# Patient Record
Sex: FEMALE | NOT HISPANIC OR LATINO | URBAN - METROPOLITAN AREA
[De-identification: names, ages, dates, MRNs, and addresses within clinical notes are randomized per-mention and may not be internally consistent; named-entity substitution may affect disease eponyms.]

---

## 2019-02-16 ENCOUNTER — APPOINTMENT (EMERGENCY)
Dept: CT IMAGING | Facility: HOSPITAL | Age: 79
DRG: 871 | End: 2019-02-16
Payer: COMMERCIAL

## 2019-02-16 ENCOUNTER — APPOINTMENT (EMERGENCY)
Dept: RADIOLOGY | Facility: HOSPITAL | Age: 79
DRG: 871 | End: 2019-02-16
Payer: COMMERCIAL

## 2019-02-16 ENCOUNTER — HOSPITAL ENCOUNTER (INPATIENT)
Facility: HOSPITAL | Age: 79
LOS: 5 days | Discharge: HOME HEALTH | DRG: 871 | End: 2019-02-21
Attending: EMERGENCY MEDICINE | Admitting: HOSPITALIST
Payer: COMMERCIAL

## 2019-02-16 DIAGNOSIS — J18.9 PNEUMONIA DUE TO INFECTIOUS ORGANISM, UNSPECIFIED LATERALITY, UNSPECIFIED PART OF LUNG: ICD-10-CM

## 2019-02-16 DIAGNOSIS — J18.9 PNEUMONIA OF BOTH LOWER LOBES DUE TO INFECTIOUS ORGANISM: ICD-10-CM

## 2019-02-16 DIAGNOSIS — A41.9 SEPSIS (HCC): ICD-10-CM

## 2019-02-16 DIAGNOSIS — R11.2 NAUSEA VOMITING AND DIARRHEA: ICD-10-CM

## 2019-02-16 DIAGNOSIS — N17.9 AKI (ACUTE KIDNEY INJURY) (HCC): ICD-10-CM

## 2019-02-16 DIAGNOSIS — J18.9 PNEUMONIA: Primary | ICD-10-CM

## 2019-02-16 DIAGNOSIS — R19.7 NAUSEA VOMITING AND DIARRHEA: ICD-10-CM

## 2019-02-16 LAB
ALBUMIN SERPL BCP-MCNC: 3.9 G/DL (ref 3.5–5.7)
ALP SERPL-CCNC: 38 U/L (ref 55–165)
ALT SERPL W P-5'-P-CCNC: 4 U/L (ref 7–52)
ANION GAP SERPL CALCULATED.3IONS-SCNC: 14 MMOL/L (ref 4–13)
ANISOCYTOSIS BLD QL SMEAR: PRESENT
ARTERIAL PATENCY WRIST A: YES
AST SERPL W P-5'-P-CCNC: 12 U/L (ref 13–39)
BASE EXCESS BLDA CALC-SCNC: -12.4 MMOL/L (ref -2–3)
BILIRUB SERPL-MCNC: 0.5 MG/DL (ref 0.2–1)
BUN SERPL-MCNC: 39 MG/DL (ref 7–25)
CALCIUM SERPL-MCNC: 9.6 MG/DL (ref 8.6–10.5)
CHLORIDE SERPL-SCNC: 109 MMOL/L (ref 98–107)
CO2 SERPL-SCNC: 18 MMOL/L (ref 21–31)
CREAT SERPL-MCNC: 2.77 MG/DL (ref 0.6–1.2)
ERYTHROCYTE [DISTWIDTH] IN BLOOD BY AUTOMATED COUNT: 15.6 % (ref 11.5–14.5)
FLUAV AG SPEC QL IA: NEGATIVE
FLUBV AG SPEC QL IA: NEGATIVE
GFR SERPL CREATININE-BSD FRML MDRD: 15 ML/MIN/1.73SQ M
GLUCOSE SERPL-MCNC: 103 MG/DL (ref 65–99)
HCO3 BLDA-SCNC: 11.1 MMOL/L (ref 22–28)
HCT VFR BLD AUTO: 39.5 % (ref 42–47)
HGB BLD-MCNC: 12.5 G/DL (ref 12–16)
INR PPP: 2.58 (ref 0.9–1.5)
LACTATE SERPL-SCNC: 2.7 MMOL/L (ref 0.5–2)
LACTATE SERPL-SCNC: 4.2 MMOL/L (ref 0.5–2)
LACTATE SERPL-SCNC: 4.3 MMOL/L (ref 0.5–2)
LIPASE SERPL-CCNC: 31 U/L (ref 11–82)
LYMPHOCYTES # BLD AUTO: 0.29 THOUSAND/UL (ref 0.6–4.47)
LYMPHOCYTES # BLD AUTO: 2 % (ref 20–51)
MAGNESIUM SERPL-MCNC: 1.6 MG/DL (ref 1.9–2.7)
MCH RBC QN AUTO: 29 PG (ref 26–34)
MCHC RBC AUTO-ENTMCNC: 31.7 G/DL (ref 31–37)
MCV RBC AUTO: 92 FL (ref 81–99)
MONOCYTES # BLD AUTO: 0.43 THOUSAND/UL (ref 0–1.22)
MONOCYTES NFR BLD AUTO: 3 % (ref 4–12)
NEUTS BAND NFR BLD MANUAL: 9 % (ref 0–8)
NEUTS SEG # BLD: 13.59 THOUSAND/UL (ref 1.81–6.82)
NEUTS SEG NFR BLD AUTO: 86 % (ref 42–75)
NRBC BLD AUTO-RTO: 0 /100 WBCS
O2 CT BLDA-SCNC: 15 ML/DL
OXYHGB MFR BLDA: 96.9 % (ref 94–100)
PCO2 BLDA: 19.5 MM HG (ref 35–45)
PH BLDA: 7.37 [PH] (ref 7.35–7.45)
PLATELET # BLD AUTO: 374 THOUSANDS/UL (ref 149–390)
PLATELET BLD QL SMEAR: ADEQUATE
PMV BLD AUTO: 8.2 FL (ref 8.6–11.7)
PO2 BLDA: 93 MM HG (ref 80–100)
POTASSIUM SERPL-SCNC: 4.2 MMOL/L (ref 3.5–5.5)
PROT SERPL-MCNC: 7.1 G/DL (ref 6.4–8.9)
PROTHROMBIN TIME: 30.2 SECONDS (ref 10.2–13)
RBC # BLD AUTO: 4.31 MILLION/UL (ref 3.9–5.2)
RBC MORPH BLD: ABNORMAL
SODIUM SERPL-SCNC: 141 MMOL/L (ref 134–143)
SPECIMEN SOURCE: ABNORMAL
TOTAL CELLS COUNTED SPEC: 100
TROPONIN I SERPL-MCNC: 0.03 NG/ML
TROPONIN I SERPL-MCNC: <0.03 NG/ML
WBC # BLD AUTO: 14.3 THOUSAND/UL (ref 4.8–10.8)

## 2019-02-16 PROCEDURE — 82805 BLOOD GASES W/O2 SATURATION: CPT | Performed by: NURSE PRACTITIONER

## 2019-02-16 PROCEDURE — 94664 DEMO&/EVAL PT USE INHALER: CPT

## 2019-02-16 PROCEDURE — 87081 CULTURE SCREEN ONLY: CPT | Performed by: NURSE PRACTITIONER

## 2019-02-16 PROCEDURE — 94760 N-INVAS EAR/PLS OXIMETRY 1: CPT

## 2019-02-16 PROCEDURE — 85027 COMPLETE CBC AUTOMATED: CPT | Performed by: PHYSICIAN ASSISTANT

## 2019-02-16 PROCEDURE — 84484 ASSAY OF TROPONIN QUANT: CPT | Performed by: NURSE PRACTITIONER

## 2019-02-16 PROCEDURE — 36415 COLL VENOUS BLD VENIPUNCTURE: CPT | Performed by: PHYSICIAN ASSISTANT

## 2019-02-16 PROCEDURE — 83735 ASSAY OF MAGNESIUM: CPT | Performed by: PHYSICIAN ASSISTANT

## 2019-02-16 PROCEDURE — 96374 THER/PROPH/DIAG INJ IV PUSH: CPT

## 2019-02-16 PROCEDURE — 83605 ASSAY OF LACTIC ACID: CPT | Performed by: PHYSICIAN ASSISTANT

## 2019-02-16 PROCEDURE — 71046 X-RAY EXAM CHEST 2 VIEWS: CPT

## 2019-02-16 PROCEDURE — 84484 ASSAY OF TROPONIN QUANT: CPT | Performed by: PHYSICIAN ASSISTANT

## 2019-02-16 PROCEDURE — 74176 CT ABD & PELVIS W/O CONTRAST: CPT

## 2019-02-16 PROCEDURE — 85610 PROTHROMBIN TIME: CPT | Performed by: NURSE PRACTITIONER

## 2019-02-16 PROCEDURE — 93005 ELECTROCARDIOGRAM TRACING: CPT

## 2019-02-16 PROCEDURE — 99223 1ST HOSP IP/OBS HIGH 75: CPT | Performed by: NURSE PRACTITIONER

## 2019-02-16 PROCEDURE — 85007 BL SMEAR W/DIFF WBC COUNT: CPT | Performed by: PHYSICIAN ASSISTANT

## 2019-02-16 PROCEDURE — 87040 BLOOD CULTURE FOR BACTERIA: CPT | Performed by: PHYSICIAN ASSISTANT

## 2019-02-16 PROCEDURE — 83605 ASSAY OF LACTIC ACID: CPT | Performed by: NURSE PRACTITIONER

## 2019-02-16 PROCEDURE — 99285 EMERGENCY DEPT VISIT HI MDM: CPT

## 2019-02-16 PROCEDURE — 80053 COMPREHEN METABOLIC PANEL: CPT | Performed by: PHYSICIAN ASSISTANT

## 2019-02-16 PROCEDURE — 36600 WITHDRAWAL OF ARTERIAL BLOOD: CPT

## 2019-02-16 PROCEDURE — 83690 ASSAY OF LIPASE: CPT | Performed by: PHYSICIAN ASSISTANT

## 2019-02-16 PROCEDURE — 87631 RESP VIRUS 3-5 TARGETS: CPT | Performed by: NURSE PRACTITIONER

## 2019-02-16 RX ORDER — ASPIRIN 81 MG/1
81 TABLET ORAL DAILY
Status: DISCONTINUED | OUTPATIENT
Start: 2019-02-17 | End: 2019-02-21 | Stop reason: HOSPADM

## 2019-02-16 RX ORDER — ASPIRIN 81 MG/1
81 TABLET ORAL DAILY
COMMUNITY
End: 2019-02-22

## 2019-02-16 RX ORDER — ATORVASTATIN CALCIUM 10 MG/1
20 TABLET, FILM COATED ORAL
Status: DISCONTINUED | OUTPATIENT
Start: 2019-02-16 | End: 2019-02-16

## 2019-02-16 RX ORDER — METOCLOPRAMIDE HYDROCHLORIDE 5 MG/ML
10 INJECTION INTRAMUSCULAR; INTRAVENOUS ONCE
Status: COMPLETED | OUTPATIENT
Start: 2019-02-16 | End: 2019-02-16

## 2019-02-16 RX ORDER — NIFEDIPINE 20 MG/1
20 CAPSULE ORAL DAILY
COMMUNITY
End: 2019-02-21 | Stop reason: HOSPADM

## 2019-02-16 RX ORDER — ONDANSETRON 2 MG/ML
4 INJECTION INTRAMUSCULAR; INTRAVENOUS EVERY 6 HOURS PRN
Status: DISCONTINUED | OUTPATIENT
Start: 2019-02-16 | End: 2019-02-21 | Stop reason: HOSPADM

## 2019-02-16 RX ORDER — NIFEDIPINE 10 MG/1
20 CAPSULE ORAL DAILY
Status: DISCONTINUED | OUTPATIENT
Start: 2019-02-16 | End: 2019-02-21 | Stop reason: HOSPADM

## 2019-02-16 RX ORDER — WARFARIN SODIUM 3 MG/1
TABLET ORAL
COMMUNITY
End: 2019-02-22

## 2019-02-16 RX ORDER — CEFEPIME HYDROCHLORIDE 2 G/50ML
2000 INJECTION, SOLUTION INTRAVENOUS EVERY 24 HOURS
Status: DISCONTINUED | OUTPATIENT
Start: 2019-02-17 | End: 2019-02-16

## 2019-02-16 RX ORDER — PANTOPRAZOLE SODIUM 40 MG/1
40 TABLET, DELAYED RELEASE ORAL DAILY
COMMUNITY
End: 2019-02-22

## 2019-02-16 RX ORDER — VANCOMYCIN HYDROCHLORIDE 1 G/200ML
15 INJECTION, SOLUTION INTRAVENOUS ONCE
Status: COMPLETED | OUTPATIENT
Start: 2019-02-16 | End: 2019-02-16

## 2019-02-16 RX ORDER — MAGNESIUM SULFATE HEPTAHYDRATE 40 MG/ML
2 INJECTION, SOLUTION INTRAVENOUS ONCE
Status: COMPLETED | OUTPATIENT
Start: 2019-02-16 | End: 2019-02-16

## 2019-02-16 RX ORDER — FEBUXOSTAT 40 MG/1
40 TABLET, FILM COATED ORAL DAILY
COMMUNITY
End: 2019-02-22

## 2019-02-16 RX ORDER — LORAZEPAM 2 MG/ML
0.5 INJECTION INTRAMUSCULAR ONCE
Status: DISCONTINUED | OUTPATIENT
Start: 2019-02-16 | End: 2019-02-21 | Stop reason: HOSPADM

## 2019-02-16 RX ORDER — LEVALBUTEROL 1.25 MG/.5ML
1.25 SOLUTION, CONCENTRATE RESPIRATORY (INHALATION)
Status: DISCONTINUED | OUTPATIENT
Start: 2019-02-16 | End: 2019-02-17

## 2019-02-16 RX ORDER — CEFEPIME HYDROCHLORIDE 1 G/50ML
1000 INJECTION, SOLUTION INTRAVENOUS EVERY 24 HOURS
Status: DISCONTINUED | OUTPATIENT
Start: 2019-02-17 | End: 2019-02-16

## 2019-02-16 RX ORDER — CEFEPIME HYDROCHLORIDE 1 G/50ML
1000 INJECTION, SOLUTION INTRAVENOUS EVERY 24 HOURS
Status: DISCONTINUED | OUTPATIENT
Start: 2019-02-17 | End: 2019-02-21 | Stop reason: HOSPADM

## 2019-02-16 RX ORDER — ACETAMINOPHEN 325 MG/1
650 TABLET ORAL EVERY 6 HOURS PRN
Status: DISCONTINUED | OUTPATIENT
Start: 2019-02-16 | End: 2019-02-21 | Stop reason: HOSPADM

## 2019-02-16 RX ORDER — ALBUTEROL SULFATE 2.5 MG/3ML
2.5 SOLUTION RESPIRATORY (INHALATION) EVERY 4 HOURS PRN
Status: DISCONTINUED | OUTPATIENT
Start: 2019-02-16 | End: 2019-02-16

## 2019-02-16 RX ORDER — PANTOPRAZOLE SODIUM 40 MG/1
40 TABLET, DELAYED RELEASE ORAL DAILY
Status: DISCONTINUED | OUTPATIENT
Start: 2019-02-17 | End: 2019-02-16

## 2019-02-16 RX ORDER — NIFEDIPINE 10 MG/1
20 CAPSULE ORAL DAILY
Status: DISCONTINUED | OUTPATIENT
Start: 2019-02-17 | End: 2019-02-16

## 2019-02-16 RX ORDER — CEFEPIME HYDROCHLORIDE 2 G/50ML
2000 INJECTION, SOLUTION INTRAVENOUS EVERY 12 HOURS
Status: DISCONTINUED | OUTPATIENT
Start: 2019-02-16 | End: 2019-02-16

## 2019-02-16 RX ORDER — SODIUM CHLORIDE FOR INHALATION 0.9 %
3 VIAL, NEBULIZER (ML) INHALATION
Status: DISCONTINUED | OUTPATIENT
Start: 2019-02-16 | End: 2019-02-17

## 2019-02-16 RX ORDER — SODIUM CHLORIDE 9 MG/ML
50 INJECTION, SOLUTION INTRAVENOUS CONTINUOUS
Status: DISCONTINUED | OUTPATIENT
Start: 2019-02-16 | End: 2019-02-19

## 2019-02-16 RX ADMIN — PIPERACILLIN SODIUM AND TAZOBACTAM SODIUM 3.38 G: 3; .375 INJECTION, POWDER, LYOPHILIZED, FOR SOLUTION INTRAVENOUS at 16:32

## 2019-02-16 RX ADMIN — SODIUM CHLORIDE, POTASSIUM CHLORIDE, SODIUM LACTATE AND CALCIUM CHLORIDE 1000 ML: 600; 310; 30; 20 INJECTION, SOLUTION INTRAVENOUS at 20:56

## 2019-02-16 RX ADMIN — MAGNESIUM SULFATE IN WATER 2 G: 40 INJECTION, SOLUTION INTRAVENOUS at 20:36

## 2019-02-16 RX ADMIN — METOCLOPRAMIDE 10 MG: 5 INJECTION, SOLUTION INTRAMUSCULAR; INTRAVENOUS at 15:56

## 2019-02-16 RX ADMIN — SODIUM CHLORIDE, POTASSIUM CHLORIDE, SODIUM LACTATE AND CALCIUM CHLORIDE 1000 ML: 600; 310; 30; 20 INJECTION, SOLUTION INTRAVENOUS at 20:42

## 2019-02-16 RX ADMIN — VANCOMYCIN HYDROCHLORIDE 1000 MG: 1 INJECTION, SOLUTION INTRAVENOUS at 17:57

## 2019-02-16 RX ADMIN — SODIUM CHLORIDE 125 ML/HR: 9 INJECTION, SOLUTION INTRAVENOUS at 23:42

## 2019-02-16 RX ADMIN — VANCOMYCIN HYDROCHLORIDE 125 MG: 500 INJECTION, POWDER, LYOPHILIZED, FOR SOLUTION INTRAVENOUS at 23:53

## 2019-02-16 RX ADMIN — SODIUM CHLORIDE 1000 ML: 0.9 INJECTION, SOLUTION INTRAVENOUS at 15:56

## 2019-02-16 RX ADMIN — CEFEPIME HYDROCHLORIDE 1000 MG: 1 INJECTION, SOLUTION INTRAVENOUS at 23:59

## 2019-02-16 RX ADMIN — METRONIDAZOLE 500 MG: 500 INJECTION, SOLUTION INTRAVENOUS at 19:40

## 2019-02-16 RX ADMIN — SODIUM CHLORIDE 125 ML/HR: 9 INJECTION, SOLUTION INTRAVENOUS at 17:33

## 2019-02-16 RX ADMIN — ALBUTEROL SULFATE 2.5 MG: 2.5 SOLUTION RESPIRATORY (INHALATION) at 18:38

## 2019-02-16 RX ADMIN — METOPROLOL TARTRATE 25 MG: 25 TABLET, FILM COATED ORAL at 20:53

## 2019-02-16 RX ADMIN — NIFEDIPINE 20 MG: 10 CAPSULE ORAL at 20:54

## 2019-02-16 NOTE — ED PROVIDER NOTES
History  No chief complaint on file  Patient here with family members with complaint of nausea vomiting and diarrhea since yesterday  Patient was in [facility] recently where she received antibiotics  She has not had antibiotics for approximately 3 weeks  Patient has a history of chronic AFib  Patient is alert oriented as can answer questions appropriately at time exam           Cannot display prior to admission medications because the patient has not been admitted in this contact  No past medical history on file  No past surgical history on file  No family history on file  I have reviewed and agree with the history as documented  Social History     Tobacco Use    Smoking status: Not on file   Substance Use Topics    Alcohol use: Not on file    Drug use: Not on file        Review of Systems   Constitutional: Negative for activity change, fatigue and fever  HENT: Negative for ear pain, sinus pain and sneezing  Eyes: Negative for pain, discharge and redness  Respiratory: Negative for choking, shortness of breath, wheezing and stridor  Cardiovascular: Negative for chest pain, palpitations and leg swelling  Gastrointestinal: Positive for diarrhea, nausea and vomiting  Negative for abdominal distention and constipation  Endocrine: Negative for cold intolerance and heat intolerance  Genitourinary: Negative for difficulty urinating, dysuria and frequency  Musculoskeletal: Negative for back pain, gait problem and joint swelling  Skin: Negative for color change, pallor, rash and wound  Neurological: Negative for dizziness, facial asymmetry and numbness  Hematological: Negative for adenopathy  Does not bruise/bleed easily  Psychiatric/Behavioral: Negative for agitation, behavioral problems and confusion  Physical Exam  Physical Exam   Constitutional: She appears well-developed and well-nourished  HENT:   Head: Normocephalic and atraumatic     Right Ear: External ear normal    Left Ear: External ear normal    Nose: Nose normal    Mouth/Throat: Oropharynx is clear and moist    Eyes: Pupils are equal, round, and reactive to light  Conjunctivae and EOM are normal    Neck: Normal range of motion  Neck supple  Cardiovascular: Normal rate, regular rhythm, normal heart sounds and intact distal pulses  Pulmonary/Chest: Effort normal and breath sounds normal    Abdominal: Bowel sounds are normal    Musculoskeletal: Normal range of motion  Neurological: She is alert  Skin: Skin is warm and dry  Capillary refill takes less than 2 seconds  Psychiatric: She has a normal mood and affect  Her behavior is normal  Judgment and thought content normal        Vital Signs  ED Triage Vitals   Temp Pulse Resp BP SpO2   -- -- -- -- --      Temp src Heart Rate Source Patient Position - Orthostatic VS BP Location FiO2 (%)   -- -- -- -- --      Pain Score       --           There were no vitals filed for this visit  Visual Acuity      ED Medications  Medications - No data to display    Diagnostic Studies  Results Reviewed     None                 No orders to display              Procedures  Procedures       Phone Contacts  ED Phone Contact    ED Course                               MDM    Disposition  Final diagnoses:   None     ED Disposition     None      Follow-up Information    None         Patient's Medications    No medications on file     No discharge procedures on file      ED Provider  Electronically Signed by           Tita Snellen, PA-C  02/16/19 3813

## 2019-02-16 NOTE — H&P
H&P- [facility] 1940, 78 y o  female MRN: [id number]    Unit/Bed#: ICU 01 Encounter: [id number]    Primary Care Provider: Devonte Rutledge MD   Date and time admitted to hospital: 2/16/2019  1:51 PM        * Severe sepsis [facility])  Assessment & Plan  · The patient meets criteria for severe sepsis with tachycardia, leukocytosis, lactic acidosis and suspecting acute kidney injury (no baseline creatinine to compare)  · Suspected the source to be secondary to healthcare associated pneumonia due to recent hospitalization 1 month ago  · Will admit the patient to ICU  · Will continue vancomycin  Will discontinue Zosyn and start cefepime  · Will check for influenza  · Check MRSA  · Pending blood cultures  · Check urine for Legionella and strep  · Aggressive IV fluid      Pneumonia due to infectious organism  Assessment & Plan  · Will treat pneumonia as healthcare associated pneumonia  · Continue treatment stated above    Acute respiratory failure with hypoxia ([address] )  Assessment & Plan  · Acute respiratory failure was likely secondary to pneumonia  · The patient presented with pulse ox of 85% on room air  · Will continue oxygen supplement  · Respiratory protocol and nebulizer treatment as needed  · Will titrate off oxygen as able  · Continue treatment for pneumonia as stated above  JATINDER (acute kidney injury) ([address] )  Assessment & Plan  · Suspected to be secondary to volume depletion and acute infection  · I have no baseline creatinine to compare  · Will check renal ultrasound to rule out any obstruction  · Will consult Nephrology  · Aggressive IV fluid  · Follow-up with renal function in the a m      Hyperlipidemia  Assessment & Plan  · Will hold off on statin for acute kidney injury    Chronic atrial fibrillation (HCC)  Assessment & Plan  · Rate is controlled  · Will continue with beta-blocker  · Continue with Coumadin  · Check daily PT INR    Nausea vomiting and diarrhea  Assessment & Plan  · This could be related to pneumonia  CT shows no acute obstruction or other infectious source  · However will check stool to rule out C diff  In addition to that I will order stool for culture ova parasite  · Place on clear liquid diet and advanced as tolerates  · Supportive care    VTE Prophylaxis: Warfarin (Coumadin)  Code Status: DNR/DNI  POLST: There is no POLST form on file for this patient (pre-hospital)  Discussion with family: daughter     Anticipated Length of Stay:  Patient will be admitted on an Inpatient basis with an anticipated length of stay of  > 2 midnights  Justification for Hospital Stay: severe sepsis    Total Time for Visit, including Counseling / Coordination of Care: 45 minutes  Greater than 50% of this total time spent on direct patient counseling and coordination of care  Chief Complaint:   Nausea, vomiting and diarrhea     History of Present Illness:    Irais Solis is a 78 y o  female who presents with 1 day history of nausea, vomiting, and diarrhea  Information was obtained from both patient and her daughter  Both patient and family live in [place] area  They are currently on vacation  Patient states that she has been feeling in her normal state of health up until last night around 2:00 a m  When she started to feel very nauseous and had multiple episodes of nonbloody vomiting with multiple episodes of diarrhea  Per daughter, patient was on a commode this a m  And had  "unresponsive/not answering questions" approx 30 seconds; subsequently, family call 911  Patient denies any fevers or chills  She denies any abdominal pain  She does have history of chronic diarrhea/durable bowel with history of colectomy in the past   However the diarrhea has been more severe over the past few weeks to the point that the patient lost control of her bowels  The patient would have 4-5 loose watery bowels on some days    Additionally, the patient has had coughing, moist but nonproductive, feeling increasing short of breath  Of note, patient was admitted to a local hospital in [address] [place] [address] approximately 1 month ago with complaint of chest heaviness and diarrhea  Per daughter, she was evaluated by Cardiology and was started on IV antibiotics for infectious diarrhea"  The family does not know whether stool culture was collected or C difficile was ruled out  Additionally the patient was having some coughing symptoms  Patient was discharged home  There were instructed to try Imodium for diarrhea which the daughter has been given the patient intermittently  The last dose of Imodium was 2 weeks ago  Patient has been doing fairly well and subsequently family brought her in [facility] for vacation  Patient has a colostomy which was reversed 25 years ago due to what the family described as obstruction  She has history of stage I colon cancer that was removed approximately 15 years ago  The last normal bowel movement was last Wednesday  Patient has a history of CVA with left hemeparesis  According to the family, the patient was taking off her Coumadin for a colonoscopy procedure 4 years ago and subsequently had a CVA  Review of Systems:  Review of Systems   Constitutional: Positive for fatigue  Negative for activity change, appetite change and unexpected weight change  Respiratory: Positive for cough and shortness of breath  Gastrointestinal: Positive for diarrhea (2 weeks), nausea and vomiting  Neurological: Positive for weakness (lower extremities weakness from previous stroke)  All other systems reviewed and are negative        Past Medical and Surgical History:   Past Medical History:   Diagnosis Date    Atrial fibrillation ([address] )     Diverticulitis     Hypertension     Stroke [facility])        Past Surgical History:   Procedure Laterality Date    COLON SURGERY      COLOSTOMY      EYE SURGERY      REVISION COLOSTOMY         Meds/Allergies:  Prior to Admission medications    Medication Sig Start Date End Date Taking? Authorizing Provider   aspirin (ECOTRIN LOW STRENGTH) 81 mg EC tablet Take 81 mg by mouth daily   Yes Historical Provider, MD   Atorvastatin Calcium (LIPITOR PO) Take by mouth daily   Yes Historical Provider, MD   Metoprolol Tartrate (LOPRESSOR PO) Take by mouth   Yes Historical Provider, MD   NISOLDIPINE PO Take by mouth   Yes Historical Provider, MD   pantoprazole (PROTONIX) 40 mg tablet Take 40 mg by mouth daily   Yes Historical Provider, MD   warfarin (COUMADIN) 3 mg tablet Take by mouth daily   Yes Historical Provider, MD     I have reviewed home medications with patient family member  Allergies: No Known Allergies    Social History:  Marital Status: /Civil Union   Occupation: retired   Patient Pre-hospital Living Situation: with family   Patient Pre-hospital Level of Mobility: assist  Patient Pre-hospital Diet Restrictions: cardiac  Substance Use History:     Social History     Substance and Sexual Activity   Alcohol Use Not Currently    Frequency: Never     Social History     Tobacco Use   Smoking Status Never Smoker   Smokeless Tobacco Never Used     Social History     Substance and Sexual Activity   Drug Use Never       Family History:  I have reviewed the patients family history    Physical Exam:   Vitals:   Blood Pressure: 156/72 (02/16/19 1730)  Pulse: 104 (02/16/19 1730)  Temperature: (!) 97 1 °F (36 2 °C) (02/16/19 1338)  Temp Source: Temporal (02/16/19 1338)  Respirations: (!) 49 (02/16/19 1730)  Height: 5' 8" (172 7 cm) (02/16/19 1715)  Weight - Scale: 57 6 kg (126 lb 14 4 oz) (02/16/19 1715)  SpO2: 92 % (02/16/19 1730)    Physical Exam   Constitutional: She is oriented to person, place, and time  She appears well-developed  Chronically ill appearing, frail elderly female     HENT:   Head: Normocephalic and atraumatic  Mouth/Throat: Oropharynx is clear and moist    Eyes: Pupils are equal, round, and reactive to light   Conjunctivae and EOM are normal    Neck: Normal range of motion  Neck supple  Cardiovascular: Normal heart sounds  Exam reveals no gallop and no friction rub  No murmur heard  Irregularly irregular      Pulmonary/Chest: She is in respiratory distress (mild distress with some accessary muscle use)  She has no wheezes  She has rales (throughout the lung fields )  Rhonchi throughout       Abdominal: Soft  Bowel sounds are normal  She exhibits no distension  There is no tenderness  There is no rebound  Musculoskeletal: She exhibits edema (trace edema LUE)  She exhibits no tenderness or deformity  Neurological: She is alert and oriented to person, place, and time  No cranial nerve deficit  Left hemeparesis from prior stroke  Skin: Skin is warm and dry  No rash noted  No erythema  Psychiatric: She has a normal mood and affect  Her behavior is normal  Thought content normal    Vitals reviewed  Additional Data:   Lab Results: I have personally reviewed pertinent reports  Results from last 7 days   Lab Units 02/16/19  1433   WBC Thousand/uL 14 30*   HEMOGLOBIN g/dL 12 5   HEMATOCRIT % 39 5*   PLATELETS Thousands/uL 374   LYMPHO PCT % 2*   MONO PCT % 3*     Results from last 7 days   Lab Units 02/16/19  1433   POTASSIUM mmol/L 4 2   CHLORIDE mmol/L 109*   CO2 mmol/L 18*   BUN mg/dL 39*   CREATININE mg/dL 2 77*   CALCIUM mg/dL 9 6   ALK PHOS U/L 38*   ALT U/L 4*   AST U/L 12*                   Imaging: I have personally reviewed pertinent reports  CT abdomen pelvis wo contrast   Final Result by Stefan Brown MD (02/16 5389)   1  No acute inflammatory process in the abdomen or pelvis  2   Patchy opacities in the left lower lobe and tree-in-bud opacities in the dependent right lower lobe likely due to an infectious or plantar process  3   Possible left renal masses measuring up to 2 8 cm  Recommend follow-up outpatient nonemergent renal ultrasound for further evaluation           Workstation performed: [id number]         XR chest 2 views    (Results Pending)       EKG, Pathology, and Other Studies Reviewed on Admission:   · EKG: afib with RVR     NetAccess/Epic Records Reviewed: Yes     ** Please Note: This note has been constructed using a voice recognition system   **

## 2019-02-16 NOTE — CONSULTS
Vancomycin Assessment    Catrachita Dixon is a 78 y o  female who is currently receiving vancomycin for Pneumonia, other sepsis   Relevant clinical data and objective history reviewed:  Creatinine   Date Value Ref Range Status   02/16/2019 2 77 (H) 0 60 - 1 20 mg/dL Final     Comment:     Standardized to IDMS reference method     BP (!) 188/76 (BP Location: Left arm)   Pulse (!) 118   Temp 99 6 °F (37 6 °C) (Tympanic)   Resp (!) 71   Ht 5' 8" (1 727 m)   Wt 57 6 kg (126 lb 14 4 oz)   SpO2 93%   BMI 19 30 kg/m²   No intake/output data recorded  Lab Results   Component Value Date/Time    BUN 39 (H) 02/16/2019 02:33 PM    WBC 14 30 (H) 02/16/2019 02:33 PM    HGB 12 5 02/16/2019 02:33 PM    HCT 39 5 (L) 02/16/2019 02:33 PM    MCV 92 02/16/2019 02:33 PM     02/16/2019 02:33 PM     Temp Readings from Last 3 Encounters:   02/16/19 99 6 °F (37 6 °C) (Tympanic)     Vancomycin Days of Therapy: 1    Assessment/Plan  The patient is currently on vancomycin utilizing scheduled dosing based on actual body weight  Baseline risks associated with therapy include:  pre-existing renal impairment, concomitant nephrotoxic medications, dehydration and advanced age  The patient is currently receiving Vancomycin 1250 mg IV Q 48Hrs  Pharmacy will also follow closely for s/sx of nephrotoxicity, infusion reactions and appropriateness of therapy  BMP and CBC will be ordered per protocol  Plan for trough as patient approaches steady state, prior to the 3rd  dose at approximately 1730 on 2/20/19  Due to infection severity, will target a trough of 15-20 (appropriate for most indications)   Pharmacy will continue to follow the patients culture results and clinical progress daily      Abimael Brizuela, Pharmacist

## 2019-02-17 ENCOUNTER — APPOINTMENT (INPATIENT)
Dept: RADIOLOGY | Facility: HOSPITAL | Age: 79
DRG: 871 | End: 2019-02-17
Payer: COMMERCIAL

## 2019-02-17 LAB
ALBUMIN SERPL BCP-MCNC: 3 G/DL (ref 3.5–5.7)
ALP SERPL-CCNC: 28 U/L (ref 55–165)
ALT SERPL W P-5'-P-CCNC: 5 U/L (ref 7–52)
AMORPH URATE CRY URNS QL MICRO: ABNORMAL /HPF
ANION GAP SERPL CALCULATED.3IONS-SCNC: 9 MMOL/L (ref 4–13)
ARTERIAL PATENCY WRIST A: YES
AST SERPL W P-5'-P-CCNC: 13 U/L (ref 13–39)
BACTERIA UR QL AUTO: ABNORMAL /HPF
BASE EXCESS BLDA CALC-SCNC: -10 MMOL/L (ref -2–3)
BASOPHILS # BLD AUTO: 0 THOUSANDS/ΜL (ref 0–0.1)
BASOPHILS NFR BLD AUTO: 0 % (ref 0–2)
BILIRUB SERPL-MCNC: 0.5 MG/DL (ref 0.2–1)
BILIRUB UR QL STRIP: NEGATIVE
BODY TEMPERATURE: 98.6 DEGREES FEHRENHEIT
BUN SERPL-MCNC: 34 MG/DL (ref 7–25)
CALCIUM SERPL-MCNC: 8.5 MG/DL (ref 8.6–10.5)
CHLORIDE SERPL-SCNC: 111 MMOL/L (ref 98–107)
CLARITY UR: CLEAR
CO2 SERPL-SCNC: 17 MMOL/L (ref 21–31)
COLOR UR: YELLOW
CREAT SERPL-MCNC: 2.36 MG/DL (ref 0.6–1.2)
EOSINOPHIL # BLD AUTO: 0 THOUSAND/ΜL (ref 0–0.61)
EOSINOPHIL NFR BLD AUTO: 0 % (ref 0–5)
ERYTHROCYTE [DISTWIDTH] IN BLOOD BY AUTOMATED COUNT: 15.3 % (ref 11.5–14.5)
FLUAV AG SPEC QL: NOT DETECTED
FLUBV AG SPEC QL: NOT DETECTED
GFR SERPL CREATININE-BSD FRML MDRD: 19 ML/MIN/1.73SQ M
GLUCOSE SERPL-MCNC: 96 MG/DL (ref 65–99)
GLUCOSE UR STRIP-MCNC: NEGATIVE MG/DL
HCO3 BLDA-SCNC: 13.7 MMOL/L (ref 22–28)
HCT VFR BLD AUTO: 31.3 % (ref 42–47)
HGB BLD-MCNC: 10.1 G/DL (ref 12–16)
HGB UR QL STRIP.AUTO: ABNORMAL
INR PPP: 2.54 (ref 0.9–1.5)
KETONES UR STRIP-MCNC: NEGATIVE MG/DL
L PNEUMO1 AG UR QL IA.RAPID: NEGATIVE
LACTATE SERPL-SCNC: 1.6 MMOL/L (ref 0.5–2)
LACTATE SERPL-SCNC: 2.6 MMOL/L (ref 0.5–2)
LEUKOCYTE ESTERASE UR QL STRIP: NEGATIVE
LYMPHOCYTES # BLD AUTO: 0.9 THOUSANDS/ΜL (ref 0.6–4.47)
LYMPHOCYTES NFR BLD AUTO: 6 % (ref 21–51)
MAGNESIUM SERPL-MCNC: 2.1 MG/DL (ref 1.9–2.7)
MCH RBC QN AUTO: 29.2 PG (ref 26–34)
MCHC RBC AUTO-ENTMCNC: 32.5 G/DL (ref 31–37)
MCV RBC AUTO: 90 FL (ref 81–99)
MONOCYTES # BLD AUTO: 1.1 THOUSAND/ΜL (ref 0.17–1.22)
MONOCYTES NFR BLD AUTO: 7 % (ref 2–12)
NASAL CANNULA: 3
NEUTROPHILS # BLD AUTO: 13.1 THOUSANDS/ΜL (ref 1.4–6.5)
NEUTS SEG NFR BLD AUTO: 87 % (ref 42–75)
NITRITE UR QL STRIP: NEGATIVE
NON-SQ EPI CELLS URNS QL MICRO: ABNORMAL /HPF
NRBC BLD AUTO-RTO: 0 /100 WBCS
O2 CT BLDA-SCNC: 13.5 ML/DL
OXYHGB MFR BLDA: 97.8 % (ref 94–100)
PCO2 BLDA: 24.5 MM HG (ref 35–45)
PH BLDA: 7.37 [PH] (ref 7.35–7.45)
PH UR STRIP.AUTO: 5.5 [PH] (ref 5–8)
PHOSPHATE SERPL-MCNC: 3.1 MG/DL (ref 3–5.5)
PLATELET # BLD AUTO: 258 THOUSANDS/UL (ref 149–390)
PMV BLD AUTO: 8.1 FL (ref 8.6–11.7)
PO2 BLDA: 114 MM HG (ref 80–100)
POTASSIUM SERPL-SCNC: 4.2 MMOL/L (ref 3.5–5.5)
PROT SERPL-MCNC: 5.7 G/DL (ref 6.4–8.9)
PROT UR STRIP-MCNC: ABNORMAL MG/DL
PROTHROMBIN TIME: 29.8 SECONDS (ref 10.2–13)
RBC # BLD AUTO: 3.47 MILLION/UL (ref 3.9–5.2)
RBC #/AREA URNS AUTO: ABNORMAL /HPF
RSV B RNA SPEC QL NAA+PROBE: NOT DETECTED
S PNEUM AG UR QL: NEGATIVE
SODIUM SERPL-SCNC: 137 MMOL/L (ref 134–143)
SP GR UR STRIP.AUTO: 1.02 (ref 1–1.03)
SPECIMEN SOURCE: ABNORMAL
UROBILINOGEN UR QL STRIP.AUTO: 0.2 E.U./DL
WBC # BLD AUTO: 15.1 THOUSAND/UL (ref 4.8–10.8)
WBC #/AREA URNS AUTO: ABNORMAL /HPF

## 2019-02-17 PROCEDURE — G8979 MOBILITY GOAL STATUS: HCPCS

## 2019-02-17 PROCEDURE — 97166 OT EVAL MOD COMPLEX 45 MIN: CPT

## 2019-02-17 PROCEDURE — 97530 THERAPEUTIC ACTIVITIES: CPT

## 2019-02-17 PROCEDURE — 94668 MNPJ CHEST WALL SBSQ: CPT

## 2019-02-17 PROCEDURE — 80053 COMPREHEN METABOLIC PANEL: CPT | Performed by: NURSE PRACTITIONER

## 2019-02-17 PROCEDURE — 36600 WITHDRAWAL OF ARTERIAL BLOOD: CPT

## 2019-02-17 PROCEDURE — 97163 PT EVAL HIGH COMPLEX 45 MIN: CPT

## 2019-02-17 PROCEDURE — 87209 SMEAR COMPLEX STAIN: CPT | Performed by: NURSE PRACTITIONER

## 2019-02-17 PROCEDURE — 81001 URINALYSIS AUTO W/SCOPE: CPT | Performed by: NURSE PRACTITIONER

## 2019-02-17 PROCEDURE — G8978 MOBILITY CURRENT STATUS: HCPCS

## 2019-02-17 PROCEDURE — 85610 PROTHROMBIN TIME: CPT | Performed by: HOSPITALIST

## 2019-02-17 PROCEDURE — G8988 SELF CARE GOAL STATUS: HCPCS

## 2019-02-17 PROCEDURE — 84100 ASSAY OF PHOSPHORUS: CPT | Performed by: NURSE PRACTITIONER

## 2019-02-17 PROCEDURE — 94640 AIRWAY INHALATION TREATMENT: CPT

## 2019-02-17 PROCEDURE — 83605 ASSAY OF LACTIC ACID: CPT | Performed by: PHYSICIAN ASSISTANT

## 2019-02-17 PROCEDURE — 87493 C DIFF AMPLIFIED PROBE: CPT | Performed by: NURSE PRACTITIONER

## 2019-02-17 PROCEDURE — 87449 NOS EACH ORGANISM AG IA: CPT | Performed by: NURSE PRACTITIONER

## 2019-02-17 PROCEDURE — 94760 N-INVAS EAR/PLS OXIMETRY 1: CPT

## 2019-02-17 PROCEDURE — 94664 DEMO&/EVAL PT USE INHALER: CPT

## 2019-02-17 PROCEDURE — 87177 OVA AND PARASITES SMEARS: CPT | Performed by: NURSE PRACTITIONER

## 2019-02-17 PROCEDURE — 82805 BLOOD GASES W/O2 SATURATION: CPT | Performed by: HOSPITALIST

## 2019-02-17 PROCEDURE — 85025 COMPLETE CBC W/AUTO DIFF WBC: CPT | Performed by: NURSE PRACTITIONER

## 2019-02-17 PROCEDURE — G8987 SELF CARE CURRENT STATUS: HCPCS

## 2019-02-17 PROCEDURE — 99233 SBSQ HOSP IP/OBS HIGH 50: CPT | Performed by: HOSPITALIST

## 2019-02-17 PROCEDURE — 71045 X-RAY EXAM CHEST 1 VIEW: CPT

## 2019-02-17 PROCEDURE — 87505 NFCT AGENT DETECTION GI: CPT | Performed by: NURSE PRACTITIONER

## 2019-02-17 PROCEDURE — 83735 ASSAY OF MAGNESIUM: CPT | Performed by: NURSE PRACTITIONER

## 2019-02-17 RX ORDER — OSELTAMIVIR PHOSPHATE 30 MG/1
30 CAPSULE ORAL EVERY 24 HOURS
Status: DISCONTINUED | OUTPATIENT
Start: 2019-02-17 | End: 2019-02-18

## 2019-02-17 RX ORDER — IPRATROPIUM BROMIDE AND ALBUTEROL SULFATE 2.5; .5 MG/3ML; MG/3ML
3 SOLUTION RESPIRATORY (INHALATION)
Status: DISCONTINUED | OUTPATIENT
Start: 2019-02-17 | End: 2019-02-19

## 2019-02-17 RX ADMIN — NIFEDIPINE 20 MG: 10 CAPSULE ORAL at 09:10

## 2019-02-17 RX ADMIN — METOPROLOL TARTRATE 25 MG: 25 TABLET, FILM COATED ORAL at 09:10

## 2019-02-17 RX ADMIN — Medication 750 MG: at 17:50

## 2019-02-17 RX ADMIN — SODIUM CHLORIDE 125 ML/HR: 9 INJECTION, SOLUTION INTRAVENOUS at 07:53

## 2019-02-17 RX ADMIN — OSELTAMIVIR PHOSPHATE 30 MG: 30 CAPSULE ORAL at 09:10

## 2019-02-17 RX ADMIN — METOPROLOL TARTRATE 25 MG: 25 TABLET, FILM COATED ORAL at 21:07

## 2019-02-17 RX ADMIN — VANCOMYCIN HYDROCHLORIDE 125 MG: 500 INJECTION, POWDER, LYOPHILIZED, FOR SOLUTION INTRAVENOUS at 17:51

## 2019-02-17 RX ADMIN — LEVALBUTEROL HYDROCHLORIDE 1.25 MG: 1.25 SOLUTION, CONCENTRATE RESPIRATORY (INHALATION) at 07:22

## 2019-02-17 RX ADMIN — ISODIUM CHLORIDE 3 ML: 0.03 SOLUTION RESPIRATORY (INHALATION) at 07:21

## 2019-02-17 RX ADMIN — ONDANSETRON HYDROCHLORIDE 4 MG: 2 INJECTION INTRAMUSCULAR; INTRAVENOUS at 07:53

## 2019-02-17 RX ADMIN — IPRATROPIUM BROMIDE AND ALBUTEROL SULFATE 3 ML: .5; 3 SOLUTION RESPIRATORY (INHALATION) at 19:55

## 2019-02-17 RX ADMIN — METRONIDAZOLE 500 MG: 500 INJECTION, SOLUTION INTRAVENOUS at 02:02

## 2019-02-17 RX ADMIN — METRONIDAZOLE 500 MG: 500 INJECTION, SOLUTION INTRAVENOUS at 17:51

## 2019-02-17 RX ADMIN — IPRATROPIUM BROMIDE AND ALBUTEROL SULFATE 3 ML: .5; 3 SOLUTION RESPIRATORY (INHALATION) at 13:46

## 2019-02-17 RX ADMIN — VANCOMYCIN HYDROCHLORIDE 125 MG: 500 INJECTION, POWDER, LYOPHILIZED, FOR SOLUTION INTRAVENOUS at 12:34

## 2019-02-17 RX ADMIN — VANCOMYCIN HYDROCHLORIDE 125 MG: 500 INJECTION, POWDER, LYOPHILIZED, FOR SOLUTION INTRAVENOUS at 05:49

## 2019-02-17 RX ADMIN — METRONIDAZOLE 500 MG: 500 INJECTION, SOLUTION INTRAVENOUS at 09:11

## 2019-02-17 RX ADMIN — ASPIRIN 81 MG: 81 TABLET, COATED ORAL at 09:10

## 2019-02-17 NOTE — PLAN OF CARE
Problem: OCCUPATIONAL THERAPY ADULT  Goal: Performs self-care activities at highest level of function for planned discharge setting  See evaluation for individualized goals  Description  Treatment Interventions: ADL retraining, Functional transfer training, UE strengthening/ROM, Endurance training, Patient/family training, Neuromuscular reeducation, Energy conservation     See flowsheet documentation for full assessment, interventions and recommendations  Note:   Limitation: Decreased ADL status, Decreased UE ROM, Decreased UE strength, Decreased Safe judgement during ADL, Decreased endurance, Decreased self-care trans, Decreased high-level ADLs  Prognosis: Fair(due to severity of functional limitations)  Assessment: Pt is a 78 y o  female seen for OT evaluation s/p admit to [address] [place] [address] on 2/16/2019 w/ Severe sepsis ([address] )  Comorbidities affecting pt's functional performance at time of assessment include: A-fib, Diverticulitis, HTN, Stroke  Personal factors affecting pt at time of IE include:difficulty performing ADLS  Prior to admission, pt required A from family for ADLs  Upon evaluation: the following deficits impact occupational performance: decreased strength, decreased balance and decreased tolerance  Pt to benefit from continued skilled OT tx while in the hospital to address deficits as defined above and maximize level of functional independence w ADL's and functional mobility  Occupational Performance areas to address include: eating, grooming, bathing/shower, toilet hygiene, dressing, functional mobility and clothing management  From OT standpoint, recommendation at time of d/c would be STR       OT Discharge Recommendation: Short Term Rehab  OT - OK to Discharge: Fatoumata Blackwell MS, OTR/L

## 2019-02-17 NOTE — NURSING NOTE
Patient admitted to ICU #1 from emergency room, diagnosis severe sepsis  Patient alert and oriented x 4, daughter and son in law present  Patient nonambulatory, assisted to hospital bed  Received on 4L02 via NC  Sp02- 92 percent  Patients breathing appears labored, however patient denies respiratory distress  RR 26-28 per minute  Rhonchi and crackles noted throughout during lung auscultation  Saran Tono NP present and aware  IVF infusing, tolerating IV ABX therapy  Temperature 100 4  No nausea or vomiting noted upon admission  Patient appears generally weak  Increased weakness noted to LUE and LLE due to previous stroke

## 2019-02-17 NOTE — PHYSICAL THERAPY NOTE
Physical Therapy Evaluation     Patient's Name: Alla Ortega    Admitting Diagnosis  Diarrhea [R19 7]  Vomiting [R11 10]  Pneumonia [J18 9]  Sepsis ([facility] ) [A41 9]    Problem List  Patient Active Problem List   Diagnosis    Pneumonia due to infectious organism    Severe sepsis ([facility] )    Nausea vomiting and diarrhea    Chronic atrial fibrillation (HCC)    Hyperlipidemia    Acute respiratory failure with hypoxia ([facility] )    JATINDER (acute kidney injury) ([place] [address] )       Past Medical History  Past Medical History:   Diagnosis Date    Atrial fibrillation ([place] [address] )     Diverticulitis     Hypertension     Stroke [facility])        Past Surgical History  Past Surgical History:   Procedure Laterality Date    COLON SURGERY      COLOSTOMY      EYE SURGERY      REVISION COLOSTOMY        02/17/19 0822   Note Type   Note type Eval/Treat   Pain Assessment   Pain Assessment No/denies pain   Pain Score No Pain   Home Living   Type of [address] Two level; Access  (w/c to enter home, stair glide inside)   Bathroom Shower/Tub Walk-in shower   Bathroom Toilet Standard   Bathroom Equipment Shower chair;Grab bars in shower;Grab bars around toilet   [address]  (rolling)   Prior Function   Level of [place] Needs assistance with IADLs; Needs assistance with ADLs and functional mobility   Lives With Daughter;Family  (retired daughter, am/pm assist)   [address] Needs assistance  (pt  does 50% of dressing)   IADLs Needs assistance   Falls in the last 6 months 0   Vocational Retired   Restrictions/Precautions   [facility] Bearing Precautions Per Order No   Braces or Orthoses LE Braces  (pt  reports having a LLE brace 2/2 hemiparesis)   Other Precautions Contact/isolation; Fall Risk;O2;Telemetry;Multiple lines  (3 L O2, does not utilize at home)   General   Family/Caregiver Present No   Cognition   Overall Cognitive Status [facility] Arousal/Participation Responsive   Orientation Level Oriented X4   Memory Within functional limits   Following Commands Follows one step commands with increased time or repetition   RUE Assessment   RUE Assessment   (see OT assessment)   LUE Assessment   LUE Assessment   (see OT assessment)   RLE Assessment   RLE Assessment X   Strength RLE   R Hip Flexion 2/5   R Knee Extension 2/5   R Ankle Dorsiflexion 2/5   LLE Assessment   LLE Assessment X   Strength LLE   L Hip Flexion 1/5   L Knee Extension 1/5   L Ankle Dorsiflexion 1/5   Coordination   Movements are Fluid and Coordinated 0   Coordination and Movement Description incremental mobility w/ increased time required, tactile cues of 2   Bed Mobility   Supine to Sit 2  Maximal assistance   Additional items Assist x 2;Verbal cues;LE management; Increased time required;HOB elevated   Sit to Supine 2  Maximal assistance   Additional items Assist x 2;Verbal cues;LE management; Increased time required   Additional Comments Upon initial transfer to [facility], patient rested >5 minutes to monitor S&S  Transfers   Sit to Stand 2  Maximal assistance   Additional items Assist x 2; Increased time required;Verbal cues  (x 2 trials)   Stand to Sit 2  Maximal assistance  (x 2 trials)   Stand pivot Unable to assess  (DNT 2/2 inability to maintain standing >5 seconds each trial)   Additional Comments Patient could not progress functional tasks 2/2 safety concerns and inability to maintain static standing posture     Balance   Static Sitting Fair -   Dynamic Sitting Fair -   Static Standing Zero   Dynamic Standing   (DNT)   Endurance Deficit   Endurance Deficit Yes   Endurance Deficit Description SOB present w/ positional changes   Activity Tolerance   Activity Tolerance Patient limited by fatigue;Treatment limited secondary to medical complications (Comment)  (/108 HR 83 O2 97% on 3 L throughout session)   Nurse Made Aware yes, Vesta   Assessment   Prognosis Fair   Problem List Decreased strength;Decreased endurance; Impaired balance;Decreased mobility; Decreased coordination; Impaired judgement;Decreased safety awareness; Impaired tone   Assessment Pt is 78 y o  female seen for PT evaluation s/p admit to [address] on 2/16/2019 w/ Severe sepsis ([facility] )  PT consulted to assess pt's functional mobility and d/c needs  Order placed for PT eval and tx, w/ activity as tolerated order  Comorbidities affecting pt's physical performance at time of assessment include: weakness w/ L hemiparesis, pneumonia, severe sepsis, SOB, JATINDER, ARF, nausea, diarrhea  PTA, pt was requiring A for mobility and lives w/ daughter in two level house w/ access  Personal factors affecting pt at time of IE include: ambulating w/ assistive device, inability to ambulate household distances, inability to navigate level surfaces w/o external assistance, unable to perform dynamic tasks in community, unable to perform physical activity and inability to perform ADLs  Please find objective findings from PT assessment regarding body systems outlined above with impairments and limitations including weakness, impaired balance, decreased endurance, impaired coordination, gait deviations, decreased activity tolerance, decreased functional mobility tolerance, decreased safety awareness, impaired judgement, fall risk and SOB upon exertion  The following objective measures performed on IE also reveal limitations: Barthel Index: 20/100  Pt's clinical presentation is currently unstable/unpredictable  Pt to benefit from continued PT tx to address deficits as defined above and maximize level of functional independent mobility and consistency  From PT/mobility standpoint, recommendation at time of d/c would be STR pending progress in order to facilitate return to PLOF     Goals   Patient Goals feel better   LTG Expiration Date 02/27/19   Long Term Goal #1 Pt will(dependent on patient's ability to safely participate in interventions) perform bed mobility tasks to mod A of 2 to decrease burden of care  Perform transfers to mod A of 2 to decrease risk of skin breakdown with change of position  Perform ambulation with RW for 10 feet, mod A of 2 to improve activity tolerance  Patient will tolerate AAROM<->PROM BLE's to decrease risk of contractures and facilitate joint proprioception  Patient will demonstrate increased static sitting balance to Fair,  Tactile cues <75% of treatment session to facilitate activation of stabilizing muscles and prepare for safe transfers  Treatment Day 1   Plan   Treatment/Interventions Functional transfer training;LE strengthening/ROM; Therapeutic exercise; Endurance training;Equipment eval/education; Bed mobility;Gait training; Compensatory technique education;OT;Spoke to nursing   PT Frequency 5x/wk   Recommendation   Recommendation Short-term skilled PT   PT - OK to Discharge Yes  (if medically stable to STR)   Additional Comments Upon conclusion, patient was resting in bed with all needs within reach and nursing staff informed of assessment outcome  Barthel Index   Feeding 5   Bathing 0   Grooming Score 0   Dressing Score 5   Bladder Score 0   Bowels Score 5   Toilet Use Score 0   Transfers (Bed/Chair) Score 5   Mobility (Level Surface) Score 0   Stairs Score 0   Barthel Index Score 20     Additional skilled interventions: therapeutic activity x 10 minutes [id number]    S: No reported pain prior or during session, patient agreeable to mobilize OOB as tolerated  O: therapeutic activity x 10 minutes including bed mobility, sit<-> stand x 2 trials  Patient required max A of 2 for bed mobility, max A of 2 sit to stand w/ RW  Verbal cueing provided to facilitate increased safety awareness and consistent cues of 2 for stability and placement  A: Patient exhibited SOB w/ functional tasks, decreased endurance, and activity intolerance requiring return BTB upon conclusion      P: Continue PT tx with progression of functional tasks as patient tolerates and can safely complete tasks, 5x/week      Paxton Mcmullen, PT

## 2019-02-17 NOTE — PLAN OF CARE
Problem: PHYSICAL THERAPY ADULT  Goal: Performs mobility at highest level of function for planned discharge setting  See evaluation for individualized goals  Description  Treatment/Interventions: Functional transfer training, LE strengthening/ROM, Therapeutic exercise, Endurance training, Equipment eval/education, Bed mobility, Gait training, Compensatory technique education, OT, Spoke to nursing     See flowsheet documentation for full assessment, interventions and recommendations  Francie Meyers PT     Note:   Prognosis: Fair  Problem List: Decreased strength, Decreased endurance, Impaired balance, Decreased mobility, Decreased coordination, Impaired judgement, Decreased safety awareness, Impaired tone  Assessment: Pt is 78 y o  female seen for PT evaluation s/p admit to [address] on 2/16/2019 w/ Severe sepsis ([facility] )  PT consulted to assess pt's functional mobility and d/c needs  Order placed for PT eval and tx, w/ activity as tolerated order  Comorbidities affecting pt's physical performance at time of assessment include: weakness w/ L hemiparesis, pneumonia, severe sepsis, SOB, JATINDER, ARF, nausea, diarrhea  PTA, pt was requiring A for mobility and lives w/ daughter in two level house w/ access  Personal factors affecting pt at time of IE include: ambulating w/ assistive device, inability to ambulate household distances, inability to navigate level surfaces w/o external assistance, unable to perform dynamic tasks in community, unable to perform physical activity and inability to perform ADLs  Please find objective findings from PT assessment regarding body systems outlined above with impairments and limitations including weakness, impaired balance, decreased endurance, impaired coordination, gait deviations, decreased activity tolerance, decreased functional mobility tolerance, decreased safety awareness, impaired judgement, fall risk and SOB upon exertion   The following objective measures performed on IE also reveal limitations: Barthel Index: 20/100  Pt's clinical presentation is currently unstable/unpredictable  Pt to benefit from continued PT tx to address deficits as defined above and maximize level of functional independent mobility and consistency  From PT/mobility standpoint, recommendation at time of d/c would be STR pending progress in order to facilitate return to PLOF  Recommendation: Short-term skilled PT     PT - OK to Discharge: Yes(if medically stable to STR)    See flowsheet documentation for full assessment     Raeann Sibley, PT

## 2019-02-17 NOTE — CONSULTS
Vancomycin Assessment    Galo Ariza is a 78 y o  female who is currently receiving vancomycin 1250 mg IV q48 for Pneumonia, other sepsis   Relevant clinical data and objective history reviewed:  Creatinine   Date Value Ref Range Status   02/17/2019 2 36 (H) 0 60 - 1 20 mg/dL Final     Comment:     Standardized to IDMS reference method   02/16/2019 2 77 (H) 0 60 - 1 20 mg/dL Final     Comment:     Standardized to IDMS reference method     BP (!) 181/74 (BP Location: Right arm)   Pulse 84   Temp 99 2 °F (37 3 °C)   Resp (!) 42   Ht 5' 8" (1 727 m)   Wt 57 6 kg (126 lb 14 4 oz)   SpO2 98%   BMI 19 30 kg/m²   I/O last 3 completed shifts: In: 4611 7 [I V :1058 3; IV Piggyback:3553 3]  Out: 410 [Urine:410]  Lab Results   Component Value Date/Time    BUN 34 (H) 02/17/2019 05:58 AM    WBC 15 10 (H) 02/17/2019 05:58 AM    HGB 10 1 (L) 02/17/2019 05:58 AM    HCT 31 3 (L) 02/17/2019 05:58 AM    MCV 90 02/17/2019 05:58 AM     02/17/2019 05:58 AM     Temp Readings from Last 3 Encounters:   02/17/19 99 2 °F (37 3 °C)     Vancomycin Days of Therapy: 2    Assessment/Plan  The patient is currently on vancomycin utilizing scheduled dosing  Baseline risks associated with therapy include: pre-existing renal impairment, advanced age and dehydration  The patient is receiving 1250 mg IV q48 based on a goal of 15-20 (appropriate for most indications) ; therefore, in light of patient's gradually improving renal function, will be changed to 750 mg IV q24   Pharmacy will continue to follow closely for s/sx of nephrotoxicity, infusion reactions and appropriateness of therapy  BMP and CBC will be ordered per protocol  Plan for trough as patient approaches steady state, prior to the 4th  dose at approximately 1700 on 2/19/19  Pharmacy will continue to follow the patient?s culture results and clinical progress daily      Martha Linares, Pharmacist

## 2019-02-17 NOTE — OCCUPATIONAL THERAPY NOTE
Occupational Therapy Evaluation      Danis Davis    2/17/2019    Patient Active Problem List   Diagnosis    Pneumonia due to infectious organism    Severe [facility] ([facility] )    Nausea vomiting and diarrhea    Chronic atrial fibrillation (HCC)    Hyperlipidemia    Acute respiratory failure with hypoxia ([facility] )    JATINDER (acute kidney injury) ([facility] )       Past Medical History:   Diagnosis Date    Atrial fibrillation ([facility] )     Diverticulitis     Hypertension     Stroke [facility])        Past Surgical History:   Procedure Laterality Date    COLON SURGERY      COLOSTOMY      EYE SURGERY      REVISION COLOSTOMY          02/17/19 4410   Note Type   Note type Eval only   Restrictions/Precautions   Weight Bearing Precautions Per Order No   Braces or Orthoses LE Braces  (pt  reports having a LLE brace 2/2 hemiparesis)   Other Precautions Contact/isolation; Fall Risk;O2;Telemetry;Multiple lines  (3L O2, does not utilize at home; [place])   Pain Assessment   Pain Assessment No/denies pain   Pain Score No Pain   Home Living   Type of Home House   Home Layout Two level; Access  (w/c to enter home, stair glide inside)   Bathroom Shower/Tub Walk-in shower   Bathroom Toilet Standard   Bathroom Equipment Shower chair;Grab bars in shower;Grab bars around toilet   [address]  (rolling)   Prior Function   Level of [place] Needs assistance with IADLs; Needs assistance with ADLs and functional mobility   Lives With Daughter;Family  (retired daughter, am/pm assist)   [address] Needs assistance  (pt  does 50% of dressing)   IADLs Needs assistance   Falls in the last 6 months 0   Vocational Retired   Subjective   Subjective "My daughter walks with me "   Bed Mobility   Supine to Sit 2  Maximal assistance   Additional items Assist x 2;Verbal cues;LE management; Increased time required;HOB elevated   Sit to Supine 2  Maximal assistance Additional items Assist x 2;Verbal cues;LE management; Increased time required   Additional Comments Upon initial transfer to EOB, patient rested >5 minutes to monitor S&S  Transfers   Sit to Stand 2  Maximal assistance   Additional items Assist x 2; Increased time required;Verbal cues  (x2 trials)   Stand to Sit 2  Maximal assistance  (x2 trials)   Stand pivot Unable to assess  (DNT 2/2 inability to maintain standing >5 seconds each trial)   Additional Comments Patient could not progress functional tasks 2/2 safety concerns and inability to maintain static standing posture  Balance   Static Sitting Fair -   Dynamic Sitting Fair -   Static Standing Zero   Dynamic Standing   (DNT)   Activity Tolerance   Activity Tolerance Patient limited by fatigue;Treatment limited secondary to medical complications (Comment)  (/108 HR 83 O2 97% on 3 L throughout session)   Nurse Made Aware yes, Vesta   RUE Assessment   RUE Assessment X  (AROM WFL)   RUE Strength   RUE Overall Strength Deficits  (3+/5)   LUE Assessment   LUE Assessment X  (PROM limited due to previous CVA)   LUE Strength   LUE Overall Strength Unable to assess  (due to AROM)   Cognition   Overall Cognitive Status [facility]   Arousal/Participation Alert; Cooperative   Attention Within functional limits   Orientation Level Oriented X4   Memory Within functional limits   Following Commands Follows one step commands with increased time or repetition   Assessment   Limitation Decreased ADL status; Decreased UE ROM; Decreased UE strength;Decreased Safe judgement during ADL;Decreased endurance;Decreased self-care trans;Decreased high-level ADLs   Prognosis Fair  (due to severity of functional limitations)   Assessment Pt is a 78 y o  female seen for OT evaluation s/p admit to [address] [place] [address] on 2/16/2019 w/ Severe sepsis ([address] [facility] [address] )  Comorbidities affecting pt's functional performance at time of assessment include: A-fib, Diverticulitis, HTN, Stroke   Personal factors affecting pt at time of IE include:difficulty performing ADLS  Prior to admission, pt required A from family for ADLs  Upon evaluation: the following deficits impact occupational performance: decreased strength, decreased balance and decreased tolerance  Pt to benefit from continued skilled OT tx while in the hospital to address deficits as defined above and maximize level of functional independence w ADL's and functional mobility  Occupational Performance areas to address include: eating, grooming, bathing/shower, toilet hygiene, dressing, functional mobility and clothing management  From OT standpoint, recommendation at time of d/c would be STR  Goals   Patient Goals Feel better   Plan   Treatment Interventions ADL retraining;Functional transfer training;UE strengthening/ROM; Endurance training;Patient/family training;Neuromuscular reeducation; Energy conservation   Goal Expiration Date 02/27/19   Treatment Day 0   OT Frequency 3-5x/wk   Recommendation   OT Discharge Recommendation Short Term Rehab   OT - OK to Discharge No   Barthel Index   Feeding 5   Bathing 0   Grooming Score 0   Dressing Score 5   Bladder Score 0   Bowels Score 5   Toilet Use Score 0   Transfers (Bed/Chair) Score 5   Mobility (Level Surface) Score 0   Stairs Score 0   Barthel Index Score 20     GOALS:    Pt will achieve the following within specified time frame: STG  Pt will achieve the following goals within 5 days    *ADL transfers with Mod (A) for inc'd independence with ADLs/purposeful tasks    *Self Feeding- Min (A) for inc'd independence with providing self nourishment    *UB ADL with Max (A) for inc'd independence with self cares    *Toileting with Max (A) for clothing management and hygiene for return to PLOF with personal care    *Increase static stand balance to P and dyn stand balance to P- for inc'd safety with standing purposeful tasks    *Increase stand tolerance x2 m for inc'd tolerance with standing purposeful tasks    *Participate in 10m UE therex to increase overall stamina/activity tolerance for purposeful tasks    *Bed mobility- Max (A) for inc'd independence to manage own comfort and initiate EOB & OOB purposeful tasks    Pt will achieve the following within specified time frame: LTG  Pt will achieve the following goals within 10 days    *ADL transfers with Min (A) for inc'd independence with ADLs/purposeful tasks    *Self Feeding- S/U for inc'd independence with providing self nourishment    *UB ADL with Mod (A) for inc'd independence with self cares    *LB ADL with Max (A) using AE prn for inc'd independence with self cares    *Toileting with Mod (A) for clothing management and hygiene for return to PLOF with personal care    *Increase static stand balance to P+ and dyn stand balance to P for inc'd safety with standing purposeful tasks    *Increase stand tolerance x5 m for inc'd tolerance with standing purposeful tasks    *Bed mobility- Mod (A) for inc'd independence to manage own comfort and initiate EOB & OOB purposeful tasks        James Mcfarlane, MS, OTR/L

## 2019-02-17 NOTE — SOCIAL WORK
CM met with pt and daughter Gracie at bedside in ICU and explained role  Pt lives with daughter in [facility] in 2 story house; chair glide inside and 0 RAKESH  Pt mostly uses a w/c or walker  Also has BSC and shower chair  Daughter and BARRON assist pt with all ADL's  Pt does not ambulate much at her baseline per radha  Pt is active with [facility] for SN and PT per daughter  STR was recommended by PT  CM discussed same with daughter  She is not in agreement with same  She would like to take pt home to [facility] upon discharge and resume Z[facility]  Family will transport  CM [facility] and they requested [facility] be faxed upon discharge for resumption of care  Pt PCP is Dr Yung Mejia  She uses [facility] in [address] for medications  Pt has no history of SNF  She further denied any history of MH or D&A treatment , CNM to follow  CM reviewed d/c planning process including the following: identifying help at home, patient preference for d/c planning needs, availability of treatment team to discuss questions or concerns patient and/or family may have regarding understanding medications and recognizing signs and symptoms once discharged  CM also encouraged patient to follow up with all recommended appointments after discharge  Patient advised of importance for patient and family to participate in managing patients medical well being

## 2019-02-17 NOTE — UTILIZATION REVIEW
Initial Clinical Review    Admission: Date/Time/Statement: 2/16/19 @ 1618 Inpatient Written   Orders Placed This Encounter   Procedures    Inpatient Admission (expected length of stay for this patient Order details is greater than two midnights)     Standing Status:   Standing     Number of Occurrences:   1     Order Specific Question:   Admitting Physician     Answer:   Héctor De La Cruz [[id number]]     Order Specific Question:   Level of Care     Answer:   Critical Care [15]     Order Specific Question:   Estimated length of stay     Answer:   More than 2 Midnights     Order Specific Question:   Certification     Answer:   I certify that inpatient services are medically necessary for this patient for a duration of greater than two midnights  See H&P and MD Progress Notes for additional information about the patient's course of treatment  ED: Date/Time/Mode of Arrival:   ED Arrival Information     Expected Arrival Acuity Means of Arrival Escorted By Service Admission Type    - 2/16/2019 13:31 Urgent Ambulance [facility] General Medicine Urgent    Arrival Complaint    diarrhea vomiting        Chief Complaint:   Chief Complaint   Patient presents with    Vomiting    Diarrhea    Cough     History of Illness: Patient here with family members with complaint of nausea vomiting and diarrhea since yesterday  Patient was in [facility] recently where she received antibiotics  She has not had antibiotics for approximately 3 weeks  Patient has a history of chronic AFib    Patient is alert oriented as can answer questions appropriately at time exam   ED Vital Signs:   ED Triage Vitals [02/16/19 1338]   Temperature Pulse Respirations Blood Pressure SpO2   (!) 97 1 °F (36 2 °C) 95 16 (!) 181/104 (!) 85 %      Temp Source Heart Rate Source Patient Position - Orthostatic VS BP Location FiO2 (%)   Temporal Monitor Sitting Right arm --      Pain Score       No Pain        Wt Readings from Last 1 Encounters:   02/16/19 57 6 kg (126 lb 14 4 oz)     Vital Signs (abnormal):  TEMP 99 8, -118, RESPS 27-76, /76, 173/92, 97/49, O2 SAT 85% ON RA  Pertinent Labs/Diagnostic Test Results: WBC 14 30, HCT 39 5, , CO2 18, BUN 39, CREAT 2 77, ALK PHOS 38, ALT 4, AST 12, AN GAP 14, MAG 1 6, LACTIC ACID 2 7, 4 3, 4 2, PT 30 2, INR 2 58  CT abdomen pelvis wo contrast   1  No acute inflammatory process in the abdomen or pelvis  2   Patchy opacities in the left lower lobe and tree-in-bud opacities in the dependent right lower lobe likely due to an infectious or plantar process  3   Possible left renal masses measuring up to 2 8 cm  EKG: afib with RVR   CXR:  PENDING  ED Treatment:   Medication Administration from 02/16/2019 1331 to 02/16/2019 1712       Date/Time Order Dose Route Action     02/16/2019 1556 metoclopramide (REGLAN) injection 10 mg 10 mg Intravenous Given     02/16/2019 1556 sodium chloride 0 9 % bolus 1,000 mL 1,000 mL Intravenous New Bag     02/16/2019 1632 piperacillin-tazobactam (ZOSYN) 3 375 g in sodium chloride 0 9 % 50 mL IVPB 3 375 g Intravenous New Bag        Past Medical/Surgical History: Active Ambulatory Problems     Diagnosis Date Noted    No Active Ambulatory Problems     Resolved Ambulatory Problems     Diagnosis Date Noted    No Resolved Ambulatory Problems     Past Medical History:   Diagnosis Date    Atrial fibrillation ([place] [address] )     Diverticulitis     Hypertension     Stroke [facility])      Admitting Diagnosis: Diarrhea [R19 7]  Vomiting [R11 10]  Pneumonia [J18 9]  Sepsis [facility] ) [A41 9]  Age/Sex: 78 y o  female   Assessment/Plan: Pt to Ed with c/o N,V,D and period of unresponsiveness  Pt previously started on ABX for "infectious diarrhea" per family  Admit INPATIENT STATUS:  ICU for Sepsis:  patient meets criteria for severe sepsis with tachycardia, leukocytosis, lactic acidosis and suspecting acute kidney injury (no baseline creatinine to compare);  Suspected the source to be secondary to healthcare associated pneumonia due to recent hospitalization 1 month ago; aggressive IVF, IV ABX, O2, renal US to r/o obstruction, labs  VTE Prophylaxis: Warfarin (Coumadin)  Anticipated Length of Stay:  Patient will be admitted on an Inpatient basis with an anticipated length of stay of  > 2 midnights  Justification for Hospital Stay: severe sepsis    Admission Orders:  ICU, Cardio-Pulmonary Monitoring  Clear liquid diet  OOB with assist  EKG  Incentive spirometry  US retroperitoneal   CXR  ABG  Stool and blood cxs  Pt, ot  Consult GI, cm, medical critical care, nephrology, pharmacy  O2 NC 2L  Sequential compression device   Scheduled Meds:   Current Facility-Administered Medications:  acetaminophen 650 mg Oral Q6H PRN   aspirin 81 mg Oral Daily   cefepime 1,000 mg Intravenous Q24H   levalbuterol 1 25 mg Nebulization TID   And      sodium chloride 3 mL Nebulization TID   LORazepam 0 5 mg Intravenous Once   metoprolol tartrate 25 mg Oral Q12H [address]   metroNIDAZOLE 500 mg Intravenous Q8H   NIFEdipine 20 mg Oral Daily   ondansetron 4 mg Intravenous Q6H PRN   oseltamivir 30 mg Oral Q24H   sodium chloride 75 mL/hr Intravenous Continuous   [START ON 2/18/2019] vancomycin 20 mg/kg Intravenous Q48H   vancomycin (VANCOCIN) oral solution 125 mg 125 mg Oral Q6H [address]     Continuous Infusions:   sodium chloride 75 mL/hr Last Rate: 125 mL/hr (02/17/19 0753)     PRN Meds:   acetaminophen    ondansetron    Network Utilization Review Department  Phone: [phone]; Fax [phone]  [email or web address]  ATTENTION: Please call with any questions or concerns to [phone]  and carefully listen to the prompts so that you are directed to the right person  Send all requests for admission clinical reviews, approved or denied determinations and any other requests to fax [phone]   All voicemails are confidential

## 2019-02-17 NOTE — PROGRESS NOTES
Progress Note - Nasreen Bras 1940, 78 y o  female MRN: [id number]    Unit/Bed#: ICU 01 Encounter: [id number]    Primary Care Provider: Jayme Milan MD   Date and time admitted to hospital: 2/16/2019  1:51 PM        * Severe sepsis [facility])  Assessment & Plan  · Sepsis parameters persist  · Patient remains tachypneic, and continues to have a leukocytosis of [address], lactic acidosis however has resolved  · Unspecified exact etiology otherwise appears to be multifactorial  · Secondary to bilateral pneumonia (healthcare associated) of suspected gram-negative etiology versus an acute influenza infection versus a GI infection versus a combination of all 3  · See the individual outlines below      Acute respiratory failure with hypoxia ([facility] )  Assessment & Plan  · Acute respiratory failure was likely secondary to pneumonia  · The patient presented with pulse ox of 85% on room air  · Patient continues with an increased respiratory effort, as well as an increased respiratory rate    Patient however retains the ability to complete full sentences  · Will check an arterial blood gas, will check a portable chest x-ray  · Continue supplemental oxygen  · Critical care medicine consultation is pending    Pneumonia due to infectious organism  Assessment & Plan  · Differential diagnosis includes a healthcare associated pneumonia (suspected g negative etiology) versus an aspiration pneumonia in the setting of nausea and vomiting versus an acute influenza a infection versus a combination of all 3  · Blood cultures, sputum cultures, a urine testing for Legionella and Streptococcus antigen are all pending  · Confirmatory testing for influenza and respiratory syncytial virus are pending  · Continue broad-spectrum antibiotics in the form of vancomycin, cefepime Flagyl for now - narrow antibiotics accordingly  · Will start the patient on empiric renally adjusted dose of Tamiflu until the confirmation test results are available    Nausea vomiting and diarrhea  Assessment & Plan  · This could be related to pneumonia  CT shows no acute obstruction or other infectious source  · Stool studies are pending continue present antibiotics  · Continue clear liquid diet for now  · Supportive care    JATINDER (acute kidney injury) ([address] )  Assessment & Plan  · Suspected to be secondary to volume depletion and acute infection  · Patient has a history of chronic kidney disease-unclear what her baseline creatinine is  · Creatinine has somewhat improved with IV fluids  · Will continue normal saline at 75 cc an hour  · Await Nephrology input  · Await results of renal ultrasound    Hyperlipidemia  Assessment & Plan  · Continue with statin      Chronic atrial fibrillation (HCC)  Assessment & Plan  · Rate controlled with metoprolol  · Continue anticoagulation with Coumadin  · Recheck an INR now, recheck an INR in the a m  · Adjust Coumadin dosing accordingly        VTE Pharmacologic Prophylaxis: Pharmacologic: Warfarin (Coumadin)    Patient Centered Rounds: I have performed bedside rounds with nursing staff today  Discussions with Specialists or Other Care Team Provider:  Case discussed with Nephrology, Critical Care Medicine, case management, nursing was  Education and Discussions with Family / Patient:  Patient was brought up to par with the plan of care for today, questions answered to her satisfaction    Time Spent for Care: 45 minutes  More than 50% of total time spent on counseling and coordination of care as described above  Current Length of Stay: 1 day(s)    Current Patient Status: Inpatient   Certification Statement: The patient will continue to require additional inpatient hospital stay due to The need for continued IV antibiotics IV fluids to ensure sepsis resolution    Discharge Plan:  Discharge planning will commence once the patient is medically stable    Code Status: Level 3 - DNAR and DNI    Subjective:   Patient seen and examined  Patient's primary complaint at this point his shortness of and persistent nausea  Patient denies any pain or discomfort  Objective:     Vitals:   Temp (24hrs), Av °F (37 2 °C), Min:97 1 °F (36 2 °C), Max:99 8 °F (37 7 °C)    Temp:  [97 1 °F (36 2 °C)-99 8 °F (37 7 °C)] 99 2 °F (37 3 °C)  HR:  [] 84  Resp:  [16-76] 42  BP: ()/() 181/74  SpO2:  [85 %-100 %] 98 %  Body mass index is 19 3 kg/m²  Input and Output Summary (last 24 hours): Intake/Output Summary (Last 24 hours) at 2019 0825  Last data filed at 2019 0501  Gross per 24 hour   Intake 4611 66 ml   Output 410 ml   Net 4201 66 ml       Physical Exam:     Physical Exam   Constitutional: She is oriented to person, place, and time  She appears well-developed and well-nourished  HENT:   Head: Normocephalic and atraumatic  Nose: Nose normal    Mouth/Throat: Oropharynx is clear and moist    Eyes: Pupils are equal, round, and reactive to light  Conjunctivae and EOM are normal    Neck: Normal range of motion  Neck supple  No JVD present  No thyromegaly present  Cardiovascular: Intact distal pulses  Exam reveals no gallop and no friction rub  No murmur heard  S1, S2, irregularly irregular, no clear-cut murmurs rubs and or gallops   Pulmonary/Chest: No respiratory distress  Increased respiratory effort, minimal use of the accessory muscles of respiration noted  Decreased breath sounds bilaterally at the bases  Harsh rhonchi appreciated in all fields   Abdominal: Soft  Bowel sounds are normal  She exhibits no distension and no mass  There is no tenderness  There is no guarding  Musculoskeletal: Normal range of motion  She exhibits no edema  Lymphadenopathy:     She has no cervical adenopathy  Neurological: She is alert and oriented to person, place, and time  No cranial nerve deficit  Skin: Skin is warm  No rash noted  No erythema  Psychiatric: She has a normal mood and affect   Her behavior is normal  Vitals reviewed  Additional Data:     Labs:    Results from last 7 days   Lab Units 02/17/19  0558   WBC Thousand/uL 15 10*   HEMOGLOBIN g/dL 10 1*   HEMATOCRIT % 31 3*   PLATELETS Thousands/uL 258   NEUTROS PCT % 87*   LYMPHS PCT % 6*   MONOS PCT % 7   EOS PCT % 0     Results from last 7 days   Lab Units 02/17/19  0558   POTASSIUM mmol/L 4 2   CHLORIDE mmol/L 111*   CO2 mmol/L 17*   BUN mg/dL 34*   CREATININE mg/dL 2 36*   CALCIUM mg/dL 8 5*   ALK PHOS U/L 28*   ALT U/L 5*   AST U/L 13     Results from last 7 days   Lab Units 02/16/19  1924   INR  2 58*               * I Have Reviewed All Lab Data Listed Above  * Additional Pertinent Lab Tests Reviewed:  [address] Admission  Reviewed    Imaging:  Imaging Reports Reviewed Today Include:  CT abdomen and pelvis    Recent Cultures (last 7 days):           Last 24 Hours Medication List:     Current Facility-Administered Medications:  acetaminophen 650 mg Oral Q6H PRN DIVINA Francis    aspirin 81 mg Oral Daily DIVINA Francis    cefepime 1,000 mg Intravenous Q24H Sallie APRIL Moncada Last Rate: Stopped (02/17/19 0130)   levalbuterol 1 25 mg Nebulization TID Griselda Pouch, MD    And        sodium chloride 3 mL Nebulization TID Griselda Pouch, MD    LORazepam 0 5 mg Intravenous Once Sallie Antwan, PA-C    metoprolol tartrate 25 mg Oral Q12H [address] DIVINA Francis    metroNIDAZOLE 500 mg Intravenous Q8H DIVINA Francis Last Rate: 500 mg (02/17/19 0202)   NIFEdipine 20 mg Oral Daily DIVINA Francis    ondansetron 4 mg Intravenous Q6H PRN DIVINA Francis    oseltamivir 30 mg Oral Q24H Griselda Pouch, MD    sodium chloride 75 mL/hr Intravenous Continuous Griselda Pouch, MD Last Rate: 125 mL/hr (02/17/19 0753)   vancomycin 12 5 mg/kg Intravenous Q24H DIVINA Francis    vancomycin (VANCOCIN) oral solution 125 mg 125 mg Oral Q6H [address] DIVINA Francis         Today, Patient Was Seen By: Catrachita Piña MD    ** Please Note: Dictation voice to text software may have been used in the creation of this document   **

## 2019-02-17 NOTE — CONSULTS
Consultation - Cailin Page 78 y o  female MRN: [id number]  Unit/Bed#: ICU 01 Encounter: [id number]      Assessment/Plan     Assessment:  Diarrhea and nausea vomiting  Plan:  I would check stools for culture and sensitivity as well as C diff  According to the daughter she was admitted previously with the same complaint  She does not know whether any stool studies were done  Due to the chronicity of the complaints patient may have IBS or any food intolerance such as celiac disease or lactose intolerance  These need to be checked if they were not before since he has had extensive workup previously  Since the nausea vomiting has subsided that may have been due to other causes  She should follow up with her primary care physician and a local gastroenterologist when she goes back to [place]  History of Present Illness   Physician Requesting Consult: Montse Clifton MD  Reason for Consult / Principal Problem:  Nausea vomiting and diarrhea  Hx and PE limited by:   HPI: Negin Bojorquez is a 78y o  year old female who presents with nausea vomiting and diarrhea  Patient lives in [place] and was visiting our area for vacation  The history was taken talking to the patient and 2 heard short her Dougie Rowe  According to the patient if she was having nausea vomiting and cough  She also has a history of chronic diarrhea which according to the daughter has been there since she can remember  However the diarrheal episodes became worse 4-1/2 years ago after her stroke  She gets these episodes off and on  The patient was admitted at [facility] in [place] near [place] 3 and half weeks ago for chest heaviness and diarrhea  According to the daughter and the patient a nuclear scan was negative for coronary artery disease  She says she was being treated for infectious diarrhea but details are not known    Patient currently does not have any nausea vomiting said it subsided  She did not have any significant diarrhea since she has been here  She denies any abdominal pains  She denies any melena or bright red blood per rectum  She denies any fevers or chills  She denies any early satiety she does have some fatigue and cough and was thought to have pneumonia and is being treated for that  Her medications  ECHO drain 81 mg daily  Lipitor daily  Metoprolol did  Protonix 40 mg and  Coumadin 3 mg tablet  Allergies are no known drug allergies    Past medical history  Atrial fibrillation  Hypertension  Status post CVA  Diverticulosis and diverticulitis  Chronic diarrhea possibly IBS  Status post colostomy and reversal of colostomy for perforated diverticulitis  Status post eye surgery  Psychosocial history nonsmoker no history of alcohol abuse no history of IVDA    Consults    Review of Systems diarrhea nausea vomiting all other systems negative except as mentioned  On physical examination she is an elderly white female lying in bed in no acute distress  She is afebrile at this time  Pulse is 60 per minute  Blood pressure is 109/51  Respiratory rate is 18  HEENT anicteric sclera  Neck supple no JVD  ENT benign  Oral buccal mucosa moist     Chest no wheezing but rales throughout  Heart irregularly irregular  Abdomen soft nontender nondistended positive bowel sound no better splenomegaly noted  Neuro awake alert oriented x3  Some mild left hemiparesis from prior stroke  Skin is warm and dry without any rash  Her white count was 14 3 1000 hemoglobin 12 5 hematocrit 39 5%  Her BUN was 39 and creatinine 2 77  LFTs were within normal limits    Her CT of the abdomen and pelvis showed no acute inflammatory process in the abdomen or pelvis  Patchy opacities in left lower lobe  Independent right lower lobe  Possible left renal masses measuring up to 2 8 cm    Historical Information   Past Medical History:   Diagnosis Date    Atrial fibrillation ([address] )     Diverticulitis  Hypertension     Stroke [facility])      Past Surgical History:   Procedure Laterality Date    COLON SURGERY      COLOSTOMY      EYE SURGERY      REVISION COLOSTOMY       Social History   Social History     Substance and Sexual Activity   Alcohol Use Not Currently    Frequency: Never     Social History     Substance and Sexual Activity   Drug Use Never     Social History     Tobacco Use   Smoking Status Never Smoker   Smokeless Tobacco Never Used     Family History: non-contributory    Meds/Allergies   all current active meds have been reviewed    No Known Allergies    Objective       Intake/Output Summary (Last 24 hours) at 2/17/2019 1105  Last data filed at 2/17/2019 0928  Gross per 24 hour   Intake 5232 49 ml   Output 595 ml   Net 4637 49 ml       Invasive Devices:   Peripheral IV 02/16/19 Left Hand (Active)   Site Assessment Intact 2/17/2019  8:00 AM   Dressing Type Transparent 2/17/2019  8:00 AM   Line Status Infusing 2/17/2019  8:00 AM   Dressing Status Intact 2/17/2019  8:00 AM   Dressing Change Due 02/20/19 2/17/2019  8:00 AM   Reason Not Rotated Not due 2/17/2019  8:00 AM       Urethral Catheter Non-latex 16 Fr  (Active)   Amt returned on insertion(mL) 100 mL 2/16/2019 11:01 PM   Securement Method Securing device (Describe) 2/16/2019 11:01 PM   Output (mL) 110 mL 2/17/2019  5:01 AM       Physical Exam    Lab Results: I have personally reviewed pertinent reports  Imaging Studies: I have personally reviewed pertinent reports  EKG, Pathology, and Other Studies:     VTE Prophylaxis:     Counseling / Coordination of Care  Total floor / unit time spent today 60 minutes  Greater than 50% of total time was spent with the patient and / or family counseling and / or coordination of care  A description of the counseling / coordination of care:  Discussed with the hospitalist and also had discussion with her daughter

## 2019-02-17 NOTE — NURSING NOTE
Pt awake and alert  Denies pain or sob  98% on 4l nc  o2 decreased to 3l n/c  Assessment as noted in icu computer system  Pt noted to have moist np cough  Sputum cup provided to pt, with education  Pt repos for comfort on right side  Safety in place, contact and droplet precautions in place

## 2019-02-17 NOTE — PLAN OF CARE
Problem: DISCHARGE PLANNING - CARE MANAGEMENT  Goal: Discharge to post-acute care or home with appropriate resources  Description  INTERVENTIONS:  - Conduct assessment to determine patient/family and health care team treatment goals, and need for post-acute services based on payer coverage, community resources, and patient preferences, and barriers to discharge  - Address psychosocial, clinical, and financial barriers to discharge as identified in assessment in conjunction with the patient/family and health care team  - Arrange appropriate level of post-acute services according to patient?s   needs and preference and payer coverage in collaboration with the physician and health care team  - Communicate with and update the patient/family, physician, and health care team regarding progress on the discharge plan  - Arrange appropriate transportation to post-acute venues  - Patient's goal is to return home with family and Mallory Anderson to resume   Outcome: Progressing

## 2019-02-18 ENCOUNTER — APPOINTMENT (INPATIENT)
Dept: ULTRASOUND IMAGING | Facility: HOSPITAL | Age: 79
DRG: 871 | End: 2019-02-18
Payer: COMMERCIAL

## 2019-02-18 LAB
ANION GAP SERPL CALCULATED.3IONS-SCNC: 7 MMOL/L (ref 4–13)
ATRIAL RATE: 71 BPM
BASOPHILS # BLD AUTO: 0 THOUSANDS/ΜL (ref 0–0.1)
BASOPHILS NFR BLD AUTO: 0 % (ref 0–2)
BUN SERPL-MCNC: 28 MG/DL (ref 7–25)
C DIFF TOX GENS STL QL NAA+PROBE: NORMAL
CALCIUM SERPL-MCNC: 7.8 MG/DL (ref 8.6–10.5)
CAMPYLOBACTER DNA SPEC NAA+PROBE: NORMAL
CHLORIDE SERPL-SCNC: 115 MMOL/L (ref 98–107)
CO2 SERPL-SCNC: 16 MMOL/L (ref 21–31)
CREAT SERPL-MCNC: 1.94 MG/DL (ref 0.6–1.2)
EOSINOPHIL # BLD AUTO: 0.5 THOUSAND/ΜL (ref 0–0.61)
EOSINOPHIL NFR BLD AUTO: 6 % (ref 0–5)
ERYTHROCYTE [DISTWIDTH] IN BLOOD BY AUTOMATED COUNT: 15.7 % (ref 11.5–14.5)
FERRITIN SERPL-MCNC: 124 NG/ML (ref 8–388)
FOLATE SERPL-MCNC: 3.4 NG/ML (ref 3.1–17.5)
GFR SERPL CREATININE-BSD FRML MDRD: 24 ML/MIN/1.73SQ M
GLUCOSE SERPL-MCNC: 75 MG/DL (ref 65–99)
HCT VFR BLD AUTO: 26.1 % (ref 42–47)
HCT VFR BLD AUTO: 26.5 % (ref 42–47)
HGB BLD-MCNC: 8.4 G/DL (ref 12–16)
HGB BLD-MCNC: 8.5 G/DL (ref 12–16)
INR PPP: 2.88 (ref 0.9–1.5)
IRON SATN MFR SERPL: 11 %
IRON SERPL-MCNC: 20 UG/DL (ref 50–170)
LYMPHOCYTES # BLD AUTO: 0.9 THOUSANDS/ΜL (ref 0.6–4.47)
LYMPHOCYTES NFR BLD AUTO: 9 % (ref 21–51)
MCH RBC QN AUTO: 29.3 PG (ref 26–34)
MCHC RBC AUTO-ENTMCNC: 31.6 G/DL (ref 31–37)
MCV RBC AUTO: 93 FL (ref 81–99)
MONOCYTES # BLD AUTO: 0.7 THOUSAND/ΜL (ref 0.17–1.22)
MONOCYTES NFR BLD AUTO: 8 % (ref 2–12)
MRSA NOSE QL CULT: NORMAL
NEUTROPHILS # BLD AUTO: 7.2 THOUSANDS/ΜL (ref 1.4–6.5)
NEUTS SEG NFR BLD AUTO: 77 % (ref 42–75)
NRBC BLD AUTO-RTO: 0 /100 WBCS
PLATELET # BLD AUTO: 206 THOUSANDS/UL (ref 149–390)
PMV BLD AUTO: 8.4 FL (ref 8.6–11.7)
POTASSIUM SERPL-SCNC: 3.6 MMOL/L (ref 3.5–5.5)
PROCALCITONIN SERPL-MCNC: 12.4 NG/ML
PROTHROMBIN TIME: 33.8 SECONDS (ref 10.2–13)
QRS AXIS: 71 DEGREES
QRSD INTERVAL: 54 MS
QT INTERVAL: 308 MS
QTC INTERVAL: 412 MS
RBC # BLD AUTO: 2.85 MILLION/UL (ref 3.9–5.2)
SALMONELLA DNA SPEC QL NAA+PROBE: NORMAL
SHIGA TOXIN STX GENE SPEC NAA+PROBE: NORMAL
SHIGELLA DNA SPEC QL NAA+PROBE: NORMAL
SODIUM SERPL-SCNC: 138 MMOL/L (ref 134–143)
T WAVE AXIS: 269 DEGREES
TIBC SERPL-MCNC: 175 UG/DL (ref 250–450)
VENTRICULAR RATE: 108 BPM
VIT B12 SERPL-MCNC: 838 PG/ML (ref 100–900)
WBC # BLD AUTO: 9.3 THOUSAND/UL (ref 4.8–10.8)

## 2019-02-18 PROCEDURE — 93010 ELECTROCARDIOGRAM REPORT: CPT | Performed by: INTERNAL MEDICINE

## 2019-02-18 PROCEDURE — 85025 COMPLETE CBC W/AUTO DIFF WBC: CPT | Performed by: HOSPITALIST

## 2019-02-18 PROCEDURE — 97530 THERAPEUTIC ACTIVITIES: CPT

## 2019-02-18 PROCEDURE — 83550 IRON BINDING TEST: CPT | Performed by: INTERNAL MEDICINE

## 2019-02-18 PROCEDURE — 97110 THERAPEUTIC EXERCISES: CPT

## 2019-02-18 PROCEDURE — 83540 ASSAY OF IRON: CPT | Performed by: INTERNAL MEDICINE

## 2019-02-18 PROCEDURE — 84145 PROCALCITONIN (PCT): CPT | Performed by: INTERNAL MEDICINE

## 2019-02-18 PROCEDURE — 82728 ASSAY OF FERRITIN: CPT | Performed by: INTERNAL MEDICINE

## 2019-02-18 PROCEDURE — 82746 ASSAY OF FOLIC ACID SERUM: CPT | Performed by: INTERNAL MEDICINE

## 2019-02-18 PROCEDURE — 94640 AIRWAY INHALATION TREATMENT: CPT

## 2019-02-18 PROCEDURE — 85610 PROTHROMBIN TIME: CPT | Performed by: HOSPITALIST

## 2019-02-18 PROCEDURE — 99232 SBSQ HOSP IP/OBS MODERATE 35: CPT | Performed by: INTERNAL MEDICINE

## 2019-02-18 PROCEDURE — 94760 N-INVAS EAR/PLS OXIMETRY 1: CPT

## 2019-02-18 PROCEDURE — 82607 VITAMIN B-12: CPT | Performed by: INTERNAL MEDICINE

## 2019-02-18 PROCEDURE — 76770 US EXAM ABDO BACK WALL COMP: CPT

## 2019-02-18 PROCEDURE — 80048 BASIC METABOLIC PNL TOTAL CA: CPT | Performed by: HOSPITALIST

## 2019-02-18 PROCEDURE — 85014 HEMATOCRIT: CPT | Performed by: INTERNAL MEDICINE

## 2019-02-18 PROCEDURE — 85018 HEMOGLOBIN: CPT | Performed by: INTERNAL MEDICINE

## 2019-02-18 RX ORDER — LORAZEPAM 2 MG/ML
0.5 INJECTION INTRAMUSCULAR 3 TIMES DAILY PRN
Status: DISCONTINUED | OUTPATIENT
Start: 2019-02-18 | End: 2019-02-21 | Stop reason: HOSPADM

## 2019-02-18 RX ADMIN — METOPROLOL TARTRATE 25 MG: 25 TABLET, FILM COATED ORAL at 20:26

## 2019-02-18 RX ADMIN — IPRATROPIUM BROMIDE AND ALBUTEROL SULFATE 3 ML: .5; 3 SOLUTION RESPIRATORY (INHALATION) at 07:25

## 2019-02-18 RX ADMIN — ONDANSETRON HYDROCHLORIDE 4 MG: 2 INJECTION INTRAMUSCULAR; INTRAVENOUS at 20:31

## 2019-02-18 RX ADMIN — VANCOMYCIN HYDROCHLORIDE 125 MG: 500 INJECTION, POWDER, LYOPHILIZED, FOR SOLUTION INTRAVENOUS at 12:15

## 2019-02-18 RX ADMIN — LORAZEPAM 0.5 MG: 2 INJECTION INTRAMUSCULAR; INTRAVENOUS at 22:26

## 2019-02-18 RX ADMIN — METRONIDAZOLE 500 MG: 500 INJECTION, SOLUTION INTRAVENOUS at 10:07

## 2019-02-18 RX ADMIN — ASPIRIN 81 MG: 81 TABLET, COATED ORAL at 10:06

## 2019-02-18 RX ADMIN — VANCOMYCIN HYDROCHLORIDE 125 MG: 500 INJECTION, POWDER, LYOPHILIZED, FOR SOLUTION INTRAVENOUS at 23:39

## 2019-02-18 RX ADMIN — IPRATROPIUM BROMIDE AND ALBUTEROL SULFATE 3 ML: .5; 3 SOLUTION RESPIRATORY (INHALATION) at 13:13

## 2019-02-18 RX ADMIN — SODIUM CHLORIDE 75 ML/HR: 9 INJECTION, SOLUTION INTRAVENOUS at 00:20

## 2019-02-18 RX ADMIN — VANCOMYCIN HYDROCHLORIDE 125 MG: 500 INJECTION, POWDER, LYOPHILIZED, FOR SOLUTION INTRAVENOUS at 00:13

## 2019-02-18 RX ADMIN — VANCOMYCIN HYDROCHLORIDE 125 MG: 500 INJECTION, POWDER, LYOPHILIZED, FOR SOLUTION INTRAVENOUS at 18:16

## 2019-02-18 RX ADMIN — METRONIDAZOLE 500 MG: 500 INJECTION, SOLUTION INTRAVENOUS at 02:16

## 2019-02-18 RX ADMIN — METOPROLOL TARTRATE 25 MG: 25 TABLET, FILM COATED ORAL at 10:06

## 2019-02-18 RX ADMIN — VANCOMYCIN HYDROCHLORIDE 125 MG: 500 INJECTION, POWDER, LYOPHILIZED, FOR SOLUTION INTRAVENOUS at 06:00

## 2019-02-18 RX ADMIN — NIFEDIPINE 20 MG: 10 CAPSULE ORAL at 10:07

## 2019-02-18 RX ADMIN — CEFEPIME HYDROCHLORIDE 1000 MG: 1 INJECTION, SOLUTION INTRAVENOUS at 00:13

## 2019-02-18 RX ADMIN — IPRATROPIUM BROMIDE AND ALBUTEROL SULFATE 3 ML: .5; 3 SOLUTION RESPIRATORY (INHALATION) at 02:46

## 2019-02-18 RX ADMIN — METRONIDAZOLE 500 MG: 500 INJECTION, SOLUTION INTRAVENOUS at 19:37

## 2019-02-18 RX ADMIN — SODIUM CHLORIDE 75 ML/HR: 9 INJECTION, SOLUTION INTRAVENOUS at 18:15

## 2019-02-18 RX ADMIN — Medication 750 MG: at 18:15

## 2019-02-18 NOTE — PROGRESS NOTES
Vancomycin IV Pharmacy-to-Dose Consultation    Jazzy Ornelas is a 78 y o  female who is currently receiving Vancomycin IV with management by the Pharmacy Consult service  Assessment/Plan:  The patient was reviewed  Renal function is stable and no signs or symptoms of nephrotoxicity and/or infusion reactions were documented in the chart  Based on today?s assessment, continue current vancomycin (day # 3) dosing of 750 mg iv q 24 hours, with a plan for trough to be drawn at 1700 on 2/19/19  We will continue to follow the patient?s culture results and clinical progress daily      Hakeem Winters, Pharmacist

## 2019-02-18 NOTE — PROGRESS NOTES
Progress Note - Melchor Drake 1940, 78 y o  female MRN: [id number]    Unit/Bed#: ICU 01 Encounter: [id number]    Primary Care Provider: Melanie James MD   Date and time admitted to hospital: 2/16/2019  1:51 PM        * Severe sepsis [facility])  Assessment & Plan  · Improving  · Lactic acidosis resolved, patient currently afebrile, and leukocytosis improved  · The follow-up procalcitonin level  · Continue current antibiotics  · Cultures have been negative to date  · Unspecified exact etiology otherwise appears to be multifactorial  · Secondary to bilateral pneumonia (healthcare associated) of suspected gram-negative etiology versus a GI infection versus a combination of both  · See the individual outlines below      Pneumonia due to infectious organism  Assessment & Plan  · Differential diagnosis includes a healthcare associated pneumonia (suspected g negative etiology) versus an aspiration pneumonia in the setting of nausea and vomiting  · Blood cultures, sputum cultures, a urine testing for Legionella and Streptococcus antigen are negative to date  · Confirmatory testing for influenza and respiratory syncytial virus are negative  · Continue broad-spectrum antibiotics in the form of vancomycin, cefepime Flagyl for now - will narrow antibiotics accordingly    Acute respiratory failure with hypoxia ([facility] [address] )  Assessment & Plan  · Acute respiratory failure was likely secondary to pneumonia  · The patient presented with pulse ox of 85% on room air  · Continue supplemental oxygen  · Appears to be improving    Anemia  Assessment & Plan  · Likely multifactorial  · Hemoglobin has been trending down since admission  · No signs of acute bleeding at this time  · Will continue to monitor  · Will check iron panel, B12, folate  · Transfuse as needed    JATINDER (acute kidney injury) ([facility] [address] )  Assessment & Plan  · Suspected to be secondary to volume depletion and acute infection  · Patient has a history of chronic kidney disease-unclear what her baseline creatinine is  · Creatinine improving  · Will follow up with Nephrology  · Follow-up renal ultrasound    Hyperlipidemia  Assessment & Plan  · Continue with statin      Chronic atrial fibrillation (HCC)  Assessment & Plan  · Rate controlled with metoprolol  · Continue anticoagulation with Coumadin  · INR therapeutic will continue to monitor  · Adjust Coumadin dosing accordingly    Nausea vomiting and diarrhea  Assessment & Plan  · This could be related to pneumonia  CT shows no acute obstruction or other infectious source  · Stool studies are pending continue present antibiotics  · Will advance diet as tolerated  · Supportive care    VTE Pharmacologic Prophylaxis: Pharmacologic: Warfarin (Coumadin)    Patient Centered Rounds: I have performed bedside rounds with nursing staff today  Discussions with Specialists or Other Care Team Provider: yes  Education and Discussions with Family / Patient: yes    Time Spent for Care: 45 minutes  More than 50% of total time spent on counseling and coordination of care as described above  Current Length of Stay: 2 day(s)    Current Patient Status: Inpatient   Certification Statement: The patient will continue to require additional inpatient hospital stay due to Sepsis    Discharge Plan:  Pending hospital course    Code Status: Level 3 - DNAR and DNI    Subjective:   No overnight events noted  Patient states that her breathing feels much improved since admission  No diarrhea or vomiting overnight  Currently afebrile  Tolerating clear liquids  Objective:     Vitals:   Temp (24hrs), Av 7 °F (37 1 °C), Min:97 7 °F (36 5 °C), Max:99 7 °F (37 6 °C)    Temp:  [97 7 °F (36 5 °C)-99 7 °F (37 6 °C)] 98 1 °F (36 7 °C)  HR:  [62-85] 78  Resp:  [20-38] 26  BP: (109-170)/() 157/88  SpO2:  [93 %-99 %] 97 %  Body mass index is 19 3 kg/m²  Input and Output Summary (last 24 hours):        Intake/Output Summary (Last 24 hours) at 2019 4252  Last data filed at 2/18/2019 7106  Gross per 24 hour   Intake 2691 66 ml   Output 588 ml   Net 2103 66 ml       Physical Exam:     Physical Exam   Constitutional:   Frail elderly female   HENT:   Head: Normocephalic and atraumatic  Eyes: Conjunctivae and EOM are normal    Neck: Normal range of motion  Neck supple  Cardiovascular: Normal rate and regular rhythm  Pulmonary/Chest: No respiratory distress  She has no wheezes  Abdominal: Soft  She exhibits no distension  There is no tenderness  Musculoskeletal:   Trace edema with left upper extremity weakness which is chronic per patient from previous stroke   Neurological: She is alert  Follows simple commands and answers questions appropriately   Skin: Skin is warm and dry  Psychiatric: She has a normal mood and affect  Additional Data:     Labs:    Results from last 7 days   Lab Units 02/18/19  0534   WBC Thousand/uL 9 30   HEMOGLOBIN g/dL 8 4*   HEMATOCRIT % 26 5*   PLATELETS Thousands/uL 206   NEUTROS PCT % 77*   LYMPHS PCT % 9*   MONOS PCT % 8   EOS PCT % 6*     Results from last 7 days   Lab Units 02/18/19  0534 02/17/19  0558   POTASSIUM mmol/L 3 6 4 2   CHLORIDE mmol/L 115* 111*   CO2 mmol/L 16* 17*   BUN mg/dL 28* 34*   CREATININE mg/dL 1 94* 2 36*   CALCIUM mg/dL 7 8* 8 5*   ALK PHOS U/L  --  28*   ALT U/L  --  5*   AST U/L  --  13     Results from last 7 days   Lab Units 02/18/19  0534   INR  2 88*               * I Have Reviewed All Lab Data Listed Above  * Additional Pertinent Lab Tests Reviewed: [facility] Admission  Reviewed    Imaging:  Imaging Reports Reviewed Today Include:  No new imaging    Recent Cultures (last 7 days):     Results from last 7 days   Lab Units 02/17/19  0234 02/16/19  1838 02/16/19  1558 02/16/19  1544   BLOOD CULTURE   --   --  No Growth at 24 hrs  No Growth at 24 hrs     INFLUENZA B PCR   --  Not Detected  --   --    RSV PCR   --  Not Detected  --   --    LEGIONELLA URINARY ANTIGEN Negative  --   --   --        Last 24 Hours Medication List:     Current Facility-Administered Medications:  acetaminophen 650 mg Oral Q6H PRN Bea Kandiow, CRNP    aspirin 81 mg Oral Daily Bea Kandiow, CRNP    cefepime 1,000 mg Intravenous Q24H Francesca Billings PA-C Last Rate: Stopped (02/18/19 0043)   ipratropium-albuterol 3 mL Nebulization Q6H Hakeem Dailey MD    LORazepam 0 5 mg Intravenous Once Francesca Billings PA-C    metoprolol tartrate 25 mg Oral Q12H [facility] Bea Kandiow, CRNP    metroNIDAZOLE 500 mg Intravenous Q8H Bea Kandiow, CRNP Last Rate: Stopped (02/18/19 0246)   NIFEdipine 20 mg Oral Daily Bea Kandiow, CRNP    ondansetron 4 mg Intravenous Q6H PRN Bea Kandiow, CRNP    sodium chloride 75 mL/hr Intravenous Continuous Hakeem Dailey MD Last Rate: 75 mL/hr (02/18/19 0020)   vancomycin 12 5 mg/kg Intravenous Q24H Bea Felicita, CRNP Last Rate: 750 mg (02/17/19 1750)   vancomycin (VANCOCIN) oral solution 125 mg 125 mg Oral Q6H [address], [place]         Today, Patient Was Seen By: Cornelia Bryant MD    ** Please Note: Dictation voice to text software may have been used in the creation of this document   **

## 2019-02-18 NOTE — CONSULTS
Consultation - Nephrology   Levi Londono 78 y o  female MRN: [id number]  Unit/Bed#: ICU 01 Encounter: [id number]      A/P:  1  Acute kidney injury atop chronic kidney disease   Likely volume related due to GI losses, patient had significant volume expansion and creatinine is improving at this time  2  Chronic kidney disease stage IIIA with baseline creatinine about 1 mg/dL  3  Chronic tubulointerstitial disease likely  4  Triple acid-base disorder   It appears the patient had a metabolic acidosis, respiratory alkalosis, metabolic acidosis at presentation  In the meantime, with improving the patient's overall diarrhea it appears that the latter metabolic acidosis due to the diarrhea has improved  Overall anticipate continued improvement in the patient's acid-base disorders as her creatinine improves  Although at this time, it is unclear as to why the patient is experiencing respiratory alkalosis  She did not appear anxious, however there may be an underlying oxygen/carbon dioxide diffusion defect which may be encouraging the tachypnea a from the physiologic standpoint  Patient no longer actively septic Continue monitor her hemodynamic in clinical status both of which are improving at this time  5  Lactic acidosis   Resolved at this time status post volume expansion  6  Severe sepsis due to healthcare associated pneumonia with possible gastroenteritis   Continue the antibiotics, continue support hemodynamically as indicated volume expansion  Viral infections including influenza and RSV are currently being excluded  Thank you for allowing us to participate in the care of your patient  Please feel free to contact us regarding the care of this patient, or any other questions/concerns that may be applicable      Patient Active Problem List   Diagnosis    Pneumonia due to infectious organism    Severe sepsis (HCC)    Nausea vomiting and diarrhea    Chronic atrial fibrillation ([address] )    Hyperlipidemia    Acute respiratory failure with hypoxia (HCC)    JATINDER (acute kidney injury) ([facility] [address] )    Anemia       History of Present Illness   Physician Requesting Consult: Tatiana Neumann MD  Reason for Consult / Principal Problem:  Acute kidney injury  Hx and PE limited by:   HPI: Casandra Lawson is a 78y o  year old female who presented to the emergency department on February 16th with complaints of nausea vomiting and diarrhea patient claims that all this began only about 24-48 hours prior presentation, history from review electronic medical records state that she has only had this approximately 1 day prior presentation  The diarrhea has been watery without blood or mucus, patient claims she has also had multiple episodes of vomiting  Patient was brought to the hospital when the patient was on the commode and became unresponsive and the family called Emergency Medical [facility]  Patient herself does not recall this, she does have chronic diarrhea from time to time due to 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 in the past   Unclear if the patient has any chronic metabolic issues with acidosis, she does not take bicarbonate supplementation in the outpatient setting  Of note the patient was apparently in the hospital approximately 1 month ago due to a infectious diarrhea   Patient had been on Imodium which was stopped approximately 2 weeks ago and she had been doing okay with respect to watery stools  In addition to this, the patient also been complaining of a productive cough prior presentation  Unclear how long she has had this cough  She currently denies any fevers  Patient was diagnosed with a healthcare associated pneumonia due to recent hospitalization and is currently on appropriate antibiotics for this  From the renal standpoint, patient appears to have a baseline creatinine around 1 mg/dL, she present with a creatinine of 2 77 mg/dL and is currently down to 1 94 mg/dL    Since presentation, the patient's bicarbonate level has been steadily decreasing is currently down a 16 millimole/L  History obtained from chart review and the patient    Review of Systems - Negative except as mentioned above in HPI, more specifics as mentioned below  Review of Systems - General ROS: positive for  - fatigue  Psychological ROS: negative  Ophthalmic ROS: negative  ENT ROS: negative  Allergy and Immunology ROS: negative  Hematological and Lymphatic ROS: negative  Endocrine ROS: negative  Respiratory ROS: no cough, shortness of breath, or wheezing  Cardiovascular ROS: no chest pain or dyspnea on exertion  Gastrointestinal ROS: as mentioned above  Genito-Urinary ROS: no dysuria, trouble voiding, or hematuria  Musculoskeletal ROS: positive for - muscular weakness  Neurological ROS: no TIA or stroke symptoms  Dermatological ROS: negative    Historical Information   Past Medical History:   Diagnosis Date    Atrial fibrillation (HCC)     Diverticulitis     Hypertension     Stroke [facility])      Past Surgical History:   Procedure Laterality Date    COLON SURGERY      COLOSTOMY      EYE SURGERY      REVISION COLOSTOMY       Social History   Social History     Substance and Sexual Activity   Alcohol Use Not Currently    Frequency: Never     Social History     Substance and Sexual Activity   Drug Use Never     Social History     Tobacco Use   Smoking Status Never Smoker   Smokeless Tobacco Never Used     History reviewed  No pertinent family history      Meds/Allergies   all current active meds have been reviewed, current meds:   Current Facility-Administered Medications   Medication Dose Route Frequency    acetaminophen (TYLENOL) tablet 650 mg  650 mg Oral Q6H PRN    aspirin (ECOTRIN LOW STRENGTH) EC tablet 81 mg  81 mg Oral Daily    cefepime (MAXIPIME) IVPB (premix) 1,000 mg  1,000 mg Intravenous Q24H    ipratropium-albuterol (DUO-NEB) 0 5-2 5 mg/3 mL inhalation solution 3 mL  3 mL Nebulization Q6H    LORazepam (ATIVAN) 2 mg/mL injection 0 5 mg  0 5 mg Intravenous Once    metoprolol tartrate (LOPRESSOR) tablet 25 mg  25 mg Oral Q12H [facility]    metroNIDAZOLE (FLAGYL) IVPB (premix) 500 mg  500 mg Intravenous Q8H    NIFEdipine (PROCARDIA) capsule 20 mg  20 mg Oral Daily    ondansetron (ZOFRAN) injection 4 mg  4 mg Intravenous Q6H PRN    sodium chloride 0 9 % infusion  75 mL/hr Intravenous Continuous    vancomycin (VANCOCIN) 750 mg in sodium chloride 0 9 % 250 mL IV piggyback  12 5 mg/kg Intravenous Q24H    vancomycin (VANCOCIN) oral solution 125 mg  125 mg Oral Q6H [facility]    and PTA meds:    Medications Prior to Admission   Medication    aspirin (ECOTRIN LOW STRENGTH) 81 mg EC tablet    Atorvastatin Calcium (LIPITOR PO)    febuxostat (ULORIC) 40 mg tablet    Metoprolol Tartrate (LOPRESSOR PO)    NIFEdipine (PROCARDIA) 20 MG capsule    pantoprazole (PROTONIX) 40 mg tablet    warfarin (COUMADIN) 3 mg tablet         No Known Allergies    Objective     Intake/Output Summary (Last 24 hours) at 2/18/2019 0927  Last data filed at 2/18/2019 0609  Gross per 24 hour   Intake 2070 83 ml   Output 588 ml   Net 1482 83 ml       Invasive Devices:   Urethral Catheter Non-latex 16 Fr  (Active)   Amt returned on insertion(mL) 100 mL 2/16/2019 11:01 PM   Collection Container Standard drainage bag 2/17/2019  9:00 PM   Securement Method Securing device (Describe) 2/17/2019  9:00 PM   Output (mL) 180 mL 2/18/2019  6:09 AM       Physical Exam      I/O last 3 completed shifts: In: 7203 3 [P O :100;  I V :3151 7; IV Piggyback:3951 7]  Out: 1123 [Urine:1123]    Vitals:    02/18/19 0800   BP: (!) 187/72   Pulse: 78   Resp: 21   Temp: 97 7 °F (36 5 °C)   SpO2: 95%       Gen: in NAD, Alert/Awake  HEENT: no sclerous icterus, MMM, neck supple  CV: +S1/S2, RRR  Lungs: CTA bilaterally  Abd: +BS, soft NT/ND  Ext: all four extremities are warm  Skin: no rashes noted  Neuro: CN II-XII intact    Current Weight: Weight - Scale: 57 6 kg (126 lb 14 4 oz)  First Weight: Weight - Scale: 65 8 kg (145 lb)    Lab Results:  I have personally reviewed pertinent labs  CBC:   Lab Results   Component Value Date    WBC 9 30 02/18/2019    HGB 8 4 (L) 02/18/2019    HCT 26 5 (L) 02/18/2019    MCV 93 02/18/2019     02/18/2019    MCH 29 3 02/18/2019    MCHC 31 6 02/18/2019    RDW 15 7 (H) 02/18/2019    MPV 8 4 (L) 02/18/2019    NRBC 0 02/18/2019     CMP:   Lab Results   Component Value Date    K 3 6 02/18/2019     (H) 02/18/2019    CO2 16 (L) 02/18/2019    BUN 28 (H) 02/18/2019    CREATININE 1 94 (H) 02/18/2019    CALCIUM 7 8 (L) 02/18/2019    EGFR 24 02/18/2019     Phosphorus: No results found for: PHOS  Magnesium: No results found for: MG  Urinalysis: No results found for: COLORU, CLARITYU, SPECGRAV, PHUR, LEUKOCYTESUR, NITRITE, PROTEINUA, GLUCOSEU, KETONESU, BILIRUBINUR, BLOODU  Ionized Calcium: No results found for: CAION  Coagulation:   Lab Results   Component Value Date    INR 2 88 (H) 02/18/2019     Troponin: No results found for: TROPONINI  ABG:   Lab Results   Component Value Date    PHART 7 370 02/17/2019    NSY8MOV 24 5 (LL) 02/17/2019    PO2ART 114 0 (H) 02/17/2019    WOV6TND 13 7 (L) 02/17/2019    BEART -10 0 (L) 02/17/2019    SOURCE Brachial, Right 02/17/2019       Results from last 7 days   Lab Units 02/18/19  0534 02/17/19  0558 02/16/19  1433   POTASSIUM mmol/L 3 6 4 2 4 2   CHLORIDE mmol/L 115* 111* 109*   CO2 mmol/L 16* 17* 18*   BUN mg/dL 28* 34* 39*   CREATININE mg/dL 1 94* 2 36* 2 77*   CALCIUM mg/dL 7 8* 8 5* 9 6   ALK PHOS U/L  --  28* 38*   ALT U/L  --  5* 4*   AST U/L  --  13 12*       Radiology review:  Procedure: Ct Abdomen Pelvis Wo Contrast    Result Date: 2/16/2019  Narrative: CT ABDOMEN AND PELVIS WITHOUT IV CONTRAST INDICATION:   17-year-old woman presenting with abdominal pain, vomiting and diarrhea    COMPARISON:  None   TECHNIQUE:  CT examination of the abdomen and pelvis was performed without intravenous contrast   Axial, sagittal, and coronal 2D reformatted images were created from the source data and submitted for interpretation  Radiation dose length product (DLP) for this visit:  288 5 mGy-cm   This examination, like all CT scans performed in the [facility], was performed utilizing techniques to minimize radiation dose exposure, including the use of iterative reconstruction and automated exposure control  Enteric contrast was not administered  FINDINGS: ABDOMEN LOWER CHEST:  Patchy opacities in the left lower lobe and tree-in-bud opacities in the dependent right lower lobe likely due to an infectious or plantar process  Christophe Merle LIVER/BILIARY TREE:  Unremarkable  GALLBLADDER:  No calcified gallstones  No pericholecystic inflammatory change  SPLEEN:  Unremarkable  PANCREAS:  Atrophic ADRENAL GLANDS:  Unremarkable  KIDNEYS/URETERS:  Question 2 left renal masses, with a possible 2 8 x 1 6 cm upper pole mass (series 4 image 60) and a 2 1 x 1 8 cm midpole mass (series 4 image 54)  Bilateral renal atrophy  No hydronephrosis  STOMACH AND BOWEL:  Unremarkable  APPENDIX:  No findings to suggest appendicitis  ABDOMINOPELVIC CAVITY:  No ascites or free intraperitoneal air  No lymphadenopathy  VESSELS:  Atherosclerotic changes are present  No evidence of aneurysm  PELVIS REPRODUCTIVE ORGANS:  Unremarkable for patient's age  URINARY BLADDER:  Unremarkable  ABDOMINAL WALL/INGUINAL REGIONS:  Unremarkable  OSSEOUS STRUCTURES:  No acute fracture or destructive osseous lesion  Impression: 1  No acute inflammatory process in the abdomen or pelvis  2   Patchy opacities in the left lower lobe and tree-in-bud opacities in the dependent right lower lobe likely due to an infectious or plantar process  3   Possible left renal masses measuring up to 2 8 cm  Recommend follow-up outpatient nonemergent renal ultrasound for further evaluation   Workstation performed: [id number]     Procedure: Xr Chest Portable    Result Date: 2/17/2019  Narrative: CHEST INDICATION:   resp distress  COMPARISON:  Chest radiograph 2/16/2019 EXAM PERFORMED/VIEWS:  XR CHEST PORTABLE FINDINGS: Mild cardiomegaly is unchanged  Pulmonary vascularity is within normal limits  The lungs are clear  Basilar opacity seen on abdominal CT 2/16/2019 are not visible on frontal plain radiographs  No pneumothorax or pleural effusion  Osseous structures appear within normal limits for patient age  Impression: No acute cardiopulmonary disease  Mild cardiomegaly is unchanged  Workstation performed: [id number]     Procedure: Xr Chest 2 Views    Result Date: 2/17/2019  Narrative: CHEST INDICATION:   weakness  COMPARISON:  None EXAM PERFORMED/VIEWS:  XR CHEST PA & LATERAL FINDINGS: Cardiomediastinal silhouette appears unremarkable  The lungs are clear  No pneumothorax or pleural effusion  Osseous structures appear within normal limits for patient age  Impression: No acute cardiopulmonary disease   Workstation performed: [id number]             [place], DO

## 2019-02-18 NOTE — CONSULTS
Daily Progress Note    Assessment/Plan    1) Nausea, vomiting & Diarrhea- Resolved at this time  Patient ate breakfast and has not had any GI symptoms today  Denies any pain  Will continue care per hospitalist staff and continue antibiotics for possible C diff as she was on antibiotics at home prior to admission  She was already on Vanco PO and Flagyl when stools were collected  Principal Problem:    Severe sepsis (HCC)  Active Problems:    Pneumonia due to infectious organism    Nausea vomiting and diarrhea    Chronic atrial fibrillation (HCC)    Hyperlipidemia    Acute respiratory failure with hypoxia (HCC)    JATINDER (acute kidney injury) ([facility] [address] )    Anemia       LOS: 2 days     Subjective     Interval History: Patient seen today for follow-up after Dr Zavala Neither saw yesterday for GI consultation  She is currently laying comfortably in bed and denies any GI symptoms  She ate breakfast  Denies any diarrhea, nausea or vomiting  She denies rectal bleeding  She says she is feeling better today  Objective     Vital signs in last 24 hours:  Temp:  [97 7 °F (36 5 °C)-99 7 °F (37 6 °C)] 97 7 °F (36 5 °C)  HR:  [63-87] 87  Resp:  [20-38] 21  BP: (129-187)/() 158/63      Physical Exam:  HEENT: Head is normocephalic atraumatic  PERRLA  Mucous membranes moist   HEART: RRR  CHEST: scattered rhonchi  ABDOMEN: Normal BS, non-distended, non-tender  EXTREMITIES: No edema, cyanosis or clubbing  RECTAL: Deferred  NEURO: AA&O times 3

## 2019-02-18 NOTE — UTILIZATION REVIEW
Notification of Inpatient Admission/Inpatient Authorization Request  This is a Notification of Inpatient Admission/Request for Inpatient Authorization for our facility [facility]  Be advised that this patient was admitted to our facility under Inpatient Status  Please contact the Utilization Review Department where the patient is receiving care services for additional admission information  Place of Service Code: 24   Place of Service Name: Inpatient Hospital  Presentation Date & Time: 2/16/2019  1:51 PM  Inpatient Admission Date & Time: 2/16/19 1618  Discharge Date & Time: No discharge date for patient encounter  Discharge Disposition (if discharged): Final discharge disposition not confirmed  Attending Physician: Cony Garcia Md [[id number]]   MIKO Zhao  [facility] ID- [id number]  [address]  Phone 1: [phone]  Fax: [phone]  Admission Orders (From admission, onward)    Ordered        02/16/19 1618  Inpatient Admission (expected length of stay for this patient Order details is greater than two midnights)  Once     Order ID Start Status   [id number] 02/16/19 1618 Completed                Facility: [address] [place] [address] Utilization Review Department  Phone: [phone]; Fax [phone]  [email or web address]  ATTENTION: Please call with any questions or concerns to [phone]  and carefully listen to the prompts so that you are directed to the right person  Send all requests for admission clinical reviews, approved or denied determinations and any other requests to fax [phone]   All voicemails are confidential

## 2019-02-18 NOTE — PHYSICAL THERAPY NOTE
02/18/19 0839   Pain Assessment   Pain Assessment 0-10   Pain Score No Pain   Restrictions/Precautions   Weight Bearing Precautions Per Order No   Braces or Orthoses LE Braces  (pt has LLE brace for hemiparesis)   Other Precautions Contact/isolation;Multiple lines;Telemetry;O2;Fall Risk   General   Chart Reviewed Yes   Family/Caregiver Present No   Cognition   Overall Cognitive Status WFL   Arousal/Participation Alert; Cooperative   Attention Within functional limits   Orientation Level Oriented to person;Oriented to place;Oriented to time   Memory Within functional limits   Following Commands Follows one step commands with increased time or repetition   Comments pt agreed to PT treatment   Subjective   Subjective "I'd like to get OOB  I'm hungry "   Bed Mobility   Rolling R 3  Moderate assistance   Additional items Assist x 1;Bedrails;Verbal cues;LE management   Supine to Sit 3  Moderate assistance   Additional items Assist x 1;Bedrails; Increased time required;Verbal cues;LE management   Additional Comments sat at EOB x 5 mins prior to standing, rolling in bed to clean due to bowel incontinence   Transfers   Sit to Stand 2  Maximal assistance   Additional items Assist x 2; Increased time required;Verbal cues   Stand to Sit 2  Maximal assistance   Additional items Assist x 2;Armrests; Increased time required;Verbal cues   Stand pivot 2  Maximal assistance   Additional items Assist x 2;Armrests; Increased time required;Verbal cues   Balance   Static Sitting Fair -   Dynamic Sitting Fair -   Static Standing Poor   Dynamic Standing Poor -   Ambulatory Zero   Endurance Deficit   Endurance Deficit Yes   Endurance Deficit Description SOB, fatigued with transfers   Activity Tolerance   Activity Tolerance Patient limited by fatigue   Nurse Made Aware yes RN Kiel Elder present   Exercises   Hip Flexion Sitting;15 reps;AROM; Right;AAROM; Left   Hip Abduction Sitting;15 reps;AROM; Right;AAROM; Left   Hip Adduction Sitting;20 reps;AROM; Bilateral  (piullow squeezes)   Knee AROM Long Arc Quad Sitting;15 reps;AROM; Right;AAROM; Left   Ankle Pumps Sitting;15 reps;AROM; Right;PROM; Left   Assessment   Prognosis Fair   Problem List Decreased strength;Decreased range of motion;Decreased endurance; Impaired balance;Decreased mobility; Decreased coordination; Impaired judgement;Decreased safety awareness; Impaired tone   Assessment Pt seen for PT treatment session this date with interventions consisting of Therapeutic exercise consisting of: AROM, AAROM and PROM 15-20 reps as able B LE in sitting position and therapeutic activity consisting of training: bed mobility, supine<>sit transfers, sit<>stand transfers, static sitting tolerance at EOB for 5 minutes w/ R UE support and stand pivot transfer bed to chair  Pt agreeable to PT treatment session upon arrival, pt found supine in bed w/ HOB elevated, in no apparent distress  In comparison to previous session, pt with improvements in activity tolerance  Post session: all needs in reach OOB in recliner, SCDs active  Continue to recommend STR at time of d/c in order to maximize pt's functional independence and safety w/ mobility  Pt continues to be functioning below baseline level, and remains limited 2* factors listed above and including decreased strength, endurance & safe functional mobility PT will continue to see pt while here in order to address the deficits listed above and provide interventions consistent w/ POC in effort to achieve STGs   Goals   Patient Goals get better   Plan   Treatment/Interventions Functional transfer training;LE strengthening/ROM; Therapeutic exercise; Endurance training;Patient/family training;Equipment eval/education; Bed mobility;Gait training;Spoke to nursing   Progress Improving as expected   PT Frequency 5x/wk   Recommendation   Recommendation Short-term skilled PT   PT - OK to Discharge Yes  (when med stable to STR)   Additional Comments pt in recliner, all needs in reach, SCDs active post session   Ross Oconnor, PTA

## 2019-02-18 NOTE — PLAN OF CARE
Problem: PHYSICAL THERAPY ADULT  Goal: Performs mobility at highest level of function for planned discharge setting  See evaluation for individualized goals  Description  Treatment/Interventions: Functional transfer training, LE strengthening/ROM, Therapeutic exercise, Endurance training, Equipment eval/education, Bed mobility, Gait training, Compensatory technique education, OT, Spoke to nursing     See flowsheet documentation for full assessment, interventions and recommendations    Curly Duverney, PT     Outcome: Progressing

## 2019-02-19 LAB
ANION GAP SERPL CALCULATED.3IONS-SCNC: 9 MMOL/L (ref 4–13)
BASOPHILS # BLD AUTO: 0.1 THOUSANDS/ΜL (ref 0–0.1)
BASOPHILS NFR BLD AUTO: 1 % (ref 0–2)
BUN SERPL-MCNC: 21 MG/DL (ref 7–25)
CALCIUM SERPL-MCNC: 8 MG/DL (ref 8.6–10.5)
CHLORIDE SERPL-SCNC: 117 MMOL/L (ref 98–107)
CO2 SERPL-SCNC: 14 MMOL/L (ref 21–31)
CREAT SERPL-MCNC: 1.66 MG/DL (ref 0.6–1.2)
EOSINOPHIL # BLD AUTO: 0.6 THOUSAND/ΜL (ref 0–0.61)
EOSINOPHIL NFR BLD AUTO: 5 % (ref 0–5)
ERYTHROCYTE [DISTWIDTH] IN BLOOD BY AUTOMATED COUNT: 15.4 % (ref 11.5–14.5)
GFR SERPL CREATININE-BSD FRML MDRD: 29 ML/MIN/1.73SQ M
GLUCOSE SERPL-MCNC: 75 MG/DL (ref 65–99)
HCT VFR BLD AUTO: 28.4 % (ref 42–47)
HGB BLD-MCNC: 9.2 G/DL (ref 12–16)
INR PPP: 2.7 (ref 0.9–1.5)
LYMPHOCYTES # BLD AUTO: 1.2 THOUSANDS/ΜL (ref 0.6–4.47)
LYMPHOCYTES NFR BLD AUTO: 11 % (ref 21–51)
MCH RBC QN AUTO: 29.9 PG (ref 26–34)
MCHC RBC AUTO-ENTMCNC: 32.5 G/DL (ref 31–37)
MCV RBC AUTO: 92 FL (ref 81–99)
MONOCYTES # BLD AUTO: 0.7 THOUSAND/ΜL (ref 0.17–1.22)
MONOCYTES NFR BLD AUTO: 7 % (ref 2–12)
NEUTROPHILS # BLD AUTO: 8.4 THOUSANDS/ΜL (ref 1.4–6.5)
NEUTS SEG NFR BLD AUTO: 77 % (ref 42–75)
NRBC BLD AUTO-RTO: 0 /100 WBCS
O+P STL CONC: NORMAL
PLATELET # BLD AUTO: 243 THOUSANDS/UL (ref 149–390)
PMV BLD AUTO: 8.6 FL (ref 8.6–11.7)
POTASSIUM SERPL-SCNC: 3.3 MMOL/L (ref 3.5–5.5)
PROCALCITONIN SERPL-MCNC: 7.39 NG/ML
PROTHROMBIN TIME: 31.6 SECONDS (ref 10.2–13)
RBC # BLD AUTO: 3.09 MILLION/UL (ref 3.9–5.2)
SODIUM SERPL-SCNC: 140 MMOL/L (ref 134–143)
VANCOMYCIN TROUGH SERPL-MCNC: 19.1 UG/ML (ref 5–12)
WBC # BLD AUTO: 11 THOUSAND/UL (ref 4.8–10.8)

## 2019-02-19 PROCEDURE — G8998 SWALLOW D/C STATUS: HCPCS

## 2019-02-19 PROCEDURE — 85610 PROTHROMBIN TIME: CPT | Performed by: INTERNAL MEDICINE

## 2019-02-19 PROCEDURE — 94668 MNPJ CHEST WALL SBSQ: CPT

## 2019-02-19 PROCEDURE — 97530 THERAPEUTIC ACTIVITIES: CPT

## 2019-02-19 PROCEDURE — G8997 SWALLOW GOAL STATUS: HCPCS

## 2019-02-19 PROCEDURE — 84145 PROCALCITONIN (PCT): CPT | Performed by: INTERNAL MEDICINE

## 2019-02-19 PROCEDURE — 94760 N-INVAS EAR/PLS OXIMETRY 1: CPT

## 2019-02-19 PROCEDURE — 80048 BASIC METABOLIC PNL TOTAL CA: CPT | Performed by: INTERNAL MEDICINE

## 2019-02-19 PROCEDURE — 94640 AIRWAY INHALATION TREATMENT: CPT

## 2019-02-19 PROCEDURE — G8996 SWALLOW CURRENT STATUS: HCPCS

## 2019-02-19 PROCEDURE — 80202 ASSAY OF VANCOMYCIN: CPT | Performed by: INTERNAL MEDICINE

## 2019-02-19 PROCEDURE — 85025 COMPLETE CBC W/AUTO DIFF WBC: CPT | Performed by: INTERNAL MEDICINE

## 2019-02-19 PROCEDURE — 92610 EVALUATE SWALLOWING FUNCTION: CPT

## 2019-02-19 PROCEDURE — 99232 SBSQ HOSP IP/OBS MODERATE 35: CPT | Performed by: INTERNAL MEDICINE

## 2019-02-19 RX ORDER — FERROUS SULFATE 325(65) MG
325 TABLET ORAL 2 TIMES DAILY WITH MEALS
Status: DISCONTINUED | OUTPATIENT
Start: 2019-02-19 | End: 2019-02-21 | Stop reason: HOSPADM

## 2019-02-19 RX ORDER — IPRATROPIUM BROMIDE AND ALBUTEROL SULFATE 2.5; .5 MG/3ML; MG/3ML
3 SOLUTION RESPIRATORY (INHALATION)
Status: DISCONTINUED | OUTPATIENT
Start: 2019-02-19 | End: 2019-02-21 | Stop reason: HOSPADM

## 2019-02-19 RX ORDER — METOCLOPRAMIDE HYDROCHLORIDE 5 MG/ML
10 INJECTION INTRAMUSCULAR; INTRAVENOUS EVERY 6 HOURS PRN
Status: DISCONTINUED | OUTPATIENT
Start: 2019-02-19 | End: 2019-02-21 | Stop reason: HOSPADM

## 2019-02-19 RX ADMIN — ASPIRIN 81 MG: 81 TABLET, COATED ORAL at 09:07

## 2019-02-19 RX ADMIN — ACETAMINOPHEN 650 MG: 325 TABLET ORAL at 09:10

## 2019-02-19 RX ADMIN — NIFEDIPINE 20 MG: 10 CAPSULE ORAL at 09:07

## 2019-02-19 RX ADMIN — Medication 750 MG: at 18:30

## 2019-02-19 RX ADMIN — CEFEPIME HYDROCHLORIDE 1000 MG: 1 INJECTION, SOLUTION INTRAVENOUS at 00:52

## 2019-02-19 RX ADMIN — METOPROLOL TARTRATE 25 MG: 25 TABLET, FILM COATED ORAL at 22:25

## 2019-02-19 RX ADMIN — METRONIDAZOLE 500 MG: 500 INJECTION, SOLUTION INTRAVENOUS at 02:24

## 2019-02-19 RX ADMIN — IPRATROPIUM BROMIDE AND ALBUTEROL SULFATE 3 ML: 2.5; .5 SOLUTION RESPIRATORY (INHALATION) at 20:32

## 2019-02-19 RX ADMIN — ONDANSETRON HYDROCHLORIDE 4 MG: 2 INJECTION INTRAMUSCULAR; INTRAVENOUS at 17:31

## 2019-02-19 RX ADMIN — Medication 75 ML/HR: at 10:22

## 2019-02-19 RX ADMIN — METOPROLOL TARTRATE 25 MG: 25 TABLET, FILM COATED ORAL at 09:07

## 2019-02-19 RX ADMIN — METOCLOPRAMIDE 10 MG: 5 INJECTION, SOLUTION INTRAMUSCULAR; INTRAVENOUS at 20:00

## 2019-02-19 RX ADMIN — VANCOMYCIN HYDROCHLORIDE 125 MG: 500 INJECTION, POWDER, LYOPHILIZED, FOR SOLUTION INTRAVENOUS at 06:12

## 2019-02-19 NOTE — PROGRESS NOTES
Patient received to m/s 116-1 from icu  Patient oriented to room/ call bell system  Patient offers no complaints at this time  Will continue to monitor

## 2019-02-19 NOTE — SOCIAL WORK
Pt dicussed during care coordination rounds  Pt not yet medically stable for discharge  STR still recomnmended  Family still wishes to take pt back to her home in [place] and follow up with [facility] already active  CM to follow

## 2019-02-19 NOTE — PLAN OF CARE
Problem: Potential for Falls  Goal: Patient will remain free of falls  Description  INTERVENTIONS:  - Assess patient frequently for physical needs  -  Identify cognitive and physical deficits and behaviors that affect risk of falls  -  [place] fall precautions as indicated by assessment   - Educate patient/family on patient safety including physical limitations  - Instruct patient to call for assistance with activity based on assessment  - Modify environment to reduce risk of injury  - Consider OT/PT consult to assist with strengthening/mobility  Outcome: Progressing     Problem: Prexisting or High Potential for Compromised Skin Integrity  Goal: Skin integrity is maintained or improved  Description  INTERVENTIONS:  - Identify patients at risk for skin breakdown  - Assess and monitor skin integrity  - Assess and monitor nutrition and hydration status  - Monitor labs (i e  albumin)  - Assess for incontinence   - Turn and reposition patient  - Assist with mobility/ambulation  - Relieve pressure over bony prominences  - Avoid friction and shearing  - Provide appropriate hygiene as needed including keeping skin clean and dry  - Evaluate need for skin moisturizer/barrier cream  - Collaborate with interdisciplinary team (i e  Nutrition, Rehabilitation, etc )   - Patient/family teaching  Outcome: Progressing     Problem: CARDIOVASCULAR - ADULT  Goal: Maintains optimal cardiac output and hemodynamic stability  Description  INTERVENTIONS:  - Monitor I/O, vital signs and rhythm  - Monitor for S/S and trends of decreased cardiac output i e  bleeding, hypotension  - Administer and titrate ordered vasoactive medications to optimize hemodynamic stability  - Assess quality of pulses, skin color and temperature  - Assess for signs of decreased coronary artery perfusion - ex   Angina  - Instruct patient to report change in severity of symptoms  Outcome: Progressing  Goal: Absence of cardiac dysrhythmias or at baseline rhythm  Description  INTERVENTIONS:  - Continuous cardiac monitoring, monitor vital signs, obtain 12 lead EKG if indicated  - Administer antiarrhythmic and heart rate control medications as ordered  - Monitor electrolytes and administer replacement therapy as ordered  Outcome: Progressing     Problem: RESPIRATORY - ADULT  Goal: Achieves optimal ventilation and oxygenation  Description  INTERVENTIONS:  - Assess for changes in respiratory status  - Assess for changes in mentation and behavior  - Position to facilitate oxygenation and minimize respiratory effort  - Oxygen administration by appropriate delivery method based on oxygen saturation (per order) or ABGs  - Initiate smoking cessation education as indicated  - Encourage broncho-pulmonary hygiene including cough, deep breathe, Incentive Spirometry  - Assess the need for suctioning and aspirate as needed  - Assess and instruct to report SOB or any respiratory difficulty  - Respiratory Therapy support as indicated  Outcome: Progressing     Problem: MUSCULOSKELETAL - ADULT  Goal: Maintain or return mobility to safest level of function  Description  INTERVENTIONS:  - Assess patient's ability to carry out ADLs; assess patient's baseline for ADL function and identify physical deficits which impact ability to perform ADLs (bathing, care of mouth/teeth, toileting, grooming, dressing, etc )  - Assess/evaluate cause of self-care deficits   - Assess range of motion  - Assess patient's mobility; develop plan if impaired  - Assess patient's need for assistive devices and provide as appropriate  - Encourage maximum independence but intervene and supervise when necessary  - Involve family in performance of ADLs  - Assess for home care needs following discharge   - Request OT consult to assist with ADL evaluation and planning for discharge  - Provide patient education as appropriate  Outcome: Progressing  Goal: Maintain proper alignment of affected body part  Description  INTERVENTIONS:  - Support, maintain and protect limb and body alignment  - Provide pt/fam with appropriate education  Outcome: Progressing     Problem: DISCHARGE PLANNING - CARE MANAGEMENT  Goal: Discharge to post-acute care or home with appropriate resources  Description  INTERVENTIONS:  - Conduct assessment to determine patient/family and health care team treatment goals, and need for post-acute services based on payer coverage, community resources, and patient preferences, and barriers to discharge  - Address psychosocial, clinical, and financial barriers to discharge as identified in assessment in conjunction with the patient/family and health care team  - Arrange appropriate level of post-acute services according to patient?s   needs and preference and payer coverage in collaboration with the physician and health care team  - Communicate with and update the patient/family, physician, and health care team regarding progress on the discharge plan  - Arrange appropriate transportation to post-acute venues  Outcome: Progressing     Problem: Nutrition/Hydration-ADULT  Goal: Nutrient/Hydration intake appropriate for improving, restoring or maintaining nutritional needs  Description  Monitor and assess patient's nutrition/hydration status for malnutrition (ex- brittle hair, bruises, dry skin, pale skin and conjunctiva, muscle wasting, smooth red tongue, and disorientation)  Collaborate with interdisciplinary team and initiate plan and interventions as ordered  Monitor patient's weight and dietary intake as ordered or per policy  Utilize nutrition screening tool and intervene per policy  Determine patient's food preferences and provide high-protein, high-caloric foods as appropriate       INTERVENTIONS:  - Monitor oral intake, urinary output, labs, and treatment plans  - Assess nutrition and hydration status and recommend course of action  - Evaluate amount of meals eaten  - Assist patient with eating if necessary   - Allow adequate time for meals  - Recommend/ encourage appropriate diets, oral nutritional supplements, and vitamin/mineral supplements  - Order, calculate, and assess calorie counts as needed  - Recommend, monitor, and adjust tube feedings and TPN/PPN based on assessed needs  - Assess need for intravenous fluids  - Provide specific nutrition/hydration education as appropriate  - Include patient/family/caregiver in decisions related to nutrition  Outcome: Progressing

## 2019-02-19 NOTE — PROGRESS NOTES
Progress Note - Nephrology   Nigel Hatchet 78 y o  female MRN: [id number]  Unit/Bed#: -01 Encounter: [id number]    A/P:  1  A/P:  1  Acute kidney injury atop chronic kidney disease               continue volume expansion improving nicely  2  Chronic kidney disease stage IIIA with baseline creatinine about 1 mg/dL  3  Chronic tubulointerstitial disease likely  4  Acidosis   Patient bicarbonate not improving, unlikely that she is experiencing significant amounts of respiratory alkalosis due to the lack of tachypnea  Will transition her IV fluids to include sodium bicarbonate at this time, will defer an ABG  5  Lactic acidosis              Resolved at this time status post volume expansion  6  Severe sepsis due to healthcare associated pneumonia with possible gastroenteritis               significantly improved, continue further care as indicated  7  Possible dysphagia   Patient be evaluated by speech pathology to further evaluate aspiration risks, further recommendations per their evaluation      Follow up reason for today's visit:  Acute renal failure/chronic kidney disease/acid-base disorder    Severe sepsis [facility])    Patient Active Problem List   Diagnosis    Pneumonia due to infectious organism    [facility] sepsis [facility]    Nausea vomiting and diarrhea    Chronic atrial fibrillation [facility]    Hyperlipidemia    Acute respiratory failure with hypoxia ([facility]    JATINDER (acute kidney injury) ([facility]    Anemia         Subjective:   Patient complaining of at least 2 watery bowel movements yesterday, and 1 since this morning  No abdominal cramping, denies fevers  Objective:     Vitals: Blood pressure (!) 181/95, pulse 68, temperature 97 7 °F (36 5 °C), temperature source Temporal, resp  rate 16, height 5' 8" (1 727 m), weight 57 6 kg (126 lb 14 4 oz), SpO2 92 %  ,Body mass index is 19 3 kg/m²      Weight (last 2 days)     Date/Time    02/17/19 1244    Comment rows:    OBSERV: Removed pillow from under patients legs repo at 02/17/19 1244                Intake/Output Summary (Last 24 hours) at 2/19/2019 0943  Last data filed at 2/19/2019 0521  Gross per 24 hour   Intake 1322 5 ml   Output 950 ml   Net 372 5 ml     I/O last 3 completed shifts: In: 3393 3 [P O :120; I V :2675; IV Piggyback:598 3]  Out: 1383 [Urine:1383]    Urethral Catheter Non-latex 16 Fr  (Active)   Amt returned on insertion(mL) 100 mL 2/16/2019 11:01 PM   Collection Container Standard drainage bag 2/17/2019  9:00 PM   Securement Method Securing device (Describe) 2/17/2019  9:00 PM   Output (mL) 350 mL 2/19/2019  5:21 AM       Physical Exam: BP (!) 181/95 (BP Location: Left arm)   Pulse 68   Temp 97 7 °F (36 5 °C) (Temporal)   Resp 16   Ht 5' 8" (1 727 m)   Wt 57 6 kg (126 lb 14 4 oz)   SpO2 92%   BMI 19 30 kg/m²     General Appearance:    Alert, cooperative, no distress, appears stated age   Head:    Normocephalic, without obvious abnormality, atraumatic   Eyes:    Conjunctiva/corneas clear   Ears:    Normal external ears   Nose:   Nares normal, septum midline, mucosa normal, no drainage    or sinus tenderness   Throat:   Lips, mucosa, and tongue normal; teeth and gums normal   Neck:   Supple   Back:     Symmetric, no curvature, ROM normal, no CVA tenderness   Lungs:     Reduced, some alveolar crackles appreciated with upper airway sounds   Chest wall:    No tenderness or deformity   Heart:    Regular rate and rhythm, S1 and S2 normal, no murmur, rub   or gallop   Abdomen:     Soft, non-tender, bowel sounds active   Extremities:   Extremities normal, atraumatic, no cyanosis or edema   Skin:   Skin color, texture, turgor normal, no rashes or lesions   Lymph nodes:   Cervical normal   Neurologic:   CNII-XII intact            Lab, Imaging and other studies: I have personally reviewed pertinent labs    CBC:   Lab Results   Component Value Date    WBC 11 00 (H) 02/19/2019    HGB 9 2 (L) 02/19/2019    HCT 28 4 (L) 02/19/2019    MCV 92 02/19/2019    PLT 243 02/19/2019    MCH 29 9 02/19/2019    MCHC 32 5 02/19/2019    RDW 15 4 (H) 02/19/2019    MPV 8 6 02/19/2019    NRBC 0 02/19/2019     CMP:   Lab Results   Component Value Date    K 3 3 (L) 02/19/2019     (H) 02/19/2019    CO2 14 (L) 02/19/2019    BUN 21 02/19/2019    CREATININE 1 66 (H) 02/19/2019    CALCIUM 8 0 (L) 02/19/2019    EGFR 29 02/19/2019         Results from last 7 days   Lab Units 02/19/19  0528 02/18/19  0534 02/17/19  0558 02/16/19  1433   POTASSIUM mmol/L 3 3* 3 6 4 2 4 2   CHLORIDE mmol/L 117* 115* 111* 109*   CO2 mmol/L 14* 16* 17* 18*   BUN mg/dL 21 28* 34* 39*   CREATININE mg/dL 1 66* 1 94* 2 36* 2 77*   CALCIUM mg/dL 8 0* 7 8* 8 5* 9 6   ALK PHOS U/L  --   --  28* 38*   ALT U/L  --   --  5* 4*   AST U/L  --   --  13 12*         Phosphorus: No results found for: PHOS  Magnesium: No results found for: MG  Urinalysis: No results found for: COLORU, CLARITYU, SPECGRAV, PHUR, LEUKOCYTESUR, NITRITE, PROTEINUA, GLUCOSEU, KETONESU, BILIRUBINUR, BLOODU  Ionized Calcium: No results found for: CAION  Coagulation: No results found for: PT, INR, APTT  Troponin: No results found for: TROPONINI  ABG: No results found for: PHART, TFB6RQJ, PO2ART, LZJ8PLO, Q6RIXKSH, BEART, SOURCE  Radiology review:     IMAGING  Procedure: Ct Abdomen Pelvis Wo Contrast    Result Date: 2/16/2019  Narrative: CT ABDOMEN AND PELVIS WITHOUT IV CONTRAST INDICATION:   28-year-old woman presenting with abdominal pain, vomiting and diarrhea    COMPARISON:  None  TECHNIQUE:  CT examination of the abdomen and pelvis was performed without intravenous contrast   Axial, sagittal, and coronal 2D reformatted images were created from the source data and submitted for interpretation  Radiation dose length product (DLP) for this visit:  288 5 mGy-cm     This examination, like all CT scans performed in the [facility], was performed utilizing techniques to minimize radiation dose exposure, including the use of iterative reconstruction and automated exposure control  Enteric contrast was not administered  FINDINGS: ABDOMEN LOWER CHEST:  Patchy opacities in the left lower lobe and tree-in-bud opacities in the dependent right lower lobe likely due to an infectious or plantar process  Mario Sosa LIVER/BILIARY TREE:  Unremarkable  GALLBLADDER:  No calcified gallstones  No pericholecystic inflammatory change  SPLEEN:  Unremarkable  PANCREAS:  Atrophic ADRENAL GLANDS:  Unremarkable  KIDNEYS/URETERS:  Question 2 left renal masses, with a possible 2 8 x 1 6 cm upper pole mass (series 4 image 60) and a 2 1 x 1 8 cm midpole mass (series 4 image 54)  Bilateral renal atrophy  No hydronephrosis  STOMACH AND BOWEL:  Unremarkable  APPENDIX:  No findings to suggest appendicitis  ABDOMINOPELVIC CAVITY:  No ascites or free intraperitoneal air  No lymphadenopathy  VESSELS:  Atherosclerotic changes are present  No evidence of aneurysm  PELVIS REPRODUCTIVE ORGANS:  Unremarkable for patient's age  URINARY BLADDER:  Unremarkable  ABDOMINAL WALL/INGUINAL REGIONS:  Unremarkable  OSSEOUS STRUCTURES:  No acute fracture or destructive osseous lesion  Impression: 1  No acute inflammatory process in the abdomen or pelvis  2   Patchy opacities in the left lower lobe and tree-in-bud opacities in the dependent right lower lobe likely due to an infectious or plantar process  3   Possible left renal masses measuring up to 2 8 cm  Recommend follow-up outpatient nonemergent renal ultrasound for further evaluation  Workstation performed: [id number]     Procedure: Xr Chest Portable    Result Date: 2/17/2019  Narrative: CHEST INDICATION:   resp distress  COMPARISON:  Chest radiograph 2/16/2019 EXAM PERFORMED/VIEWS:  XR CHEST PORTABLE FINDINGS: Mild cardiomegaly is unchanged  Pulmonary vascularity is within normal limits  The lungs are clear  Basilar opacity seen on abdominal CT 2/16/2019 are not visible on frontal plain radiographs  No pneumothorax or pleural effusion  Osseous structures appear within normal limits for patient age  Impression: No acute cardiopulmonary disease  Mild cardiomegaly is unchanged  Workstation performed: [id number]     Procedure: Xr Chest 2 Views    Result Date: 2/17/2019  Narrative: CHEST INDICATION:   weakness  COMPARISON:  None EXAM PERFORMED/VIEWS:  XR CHEST PA & LATERAL FINDINGS: Cardiomediastinal silhouette appears unremarkable  The lungs are clear  No pneumothorax or pleural effusion  Osseous structures appear within normal limits for patient age  Impression: No acute cardiopulmonary disease  Workstation performed: [id number]     Procedure: Us Kidney And Bladder    Result Date: 2/19/2019  Narrative: RENAL ULTRASOUND INDICATION:   RENAL FAILURE, CHRONIC renal failure  COMPARISON: None TECHNIQUE:   Ultrasound of the retroperitoneum was performed with a curvilinear transducer utilizing volumetric sweeps and still imaging techniques  FINDINGS: KIDNEYS: Symmetric and normal size  Right kidney:  10 x 3 7 cm  Left kidney:  10 4 x 4 6 cm  Right kidney The renal parenchyma is diffusely echogenic consistent with medical renal disease  Moderate diffuse cortical thinning  No suspicious masses detected  Few cortical simple cyst   The largest measures 1 4 x 0 7 x 1 1 cm  No hydronephrosis  No shadowing calculi  No perinephric fluid collections  Left kidney The renal parenchyma is diffusely echogenic consistent with medical renal disease  Moderate diffuse cortical thinning  No suspicious masses detected  Few simple cysts  The largest measures 3 1 x 2 1 x 2 3 cm  No hydronephrosis  No shadowing calculi  No perinephric fluid collections  URETERS: Nonvisualized  BLADDER: A hernández catheter is in place, decompressing the bladder  Hernández was clamped at time of imaging  Suboptimal distention of the bladder limits evaluation  No gross focal thickening or mass lesion  Impression: Chronic medical renal disease  No hydronephrosis   Bilateral renal simple cysts   The largest on the right measures 1 4 cm and on the left 3 1 cm  Suboptimal distention of the bladder limits evaluation  No gross focal thickening or mass lesion   Workstation performed: [id number]       Current Facility-Administered Medications   Medication Dose Route Frequency    acetaminophen (TYLENOL) tablet 650 mg  650 mg Oral Q6H PRN    aspirin (ECOTRIN LOW STRENGTH) EC tablet 81 mg  81 mg Oral Daily    cefepime (MAXIPIME) IVPB (premix) 1,000 mg  1,000 mg Intravenous Q24H    ipratropium-albuterol (DUO-NEB) 0 5-2 5 mg/3 mL inhalation solution 3 mL  3 mL Nebulization Q6H    LORazepam (ATIVAN) 2 mg/mL injection 0 5 mg  0 5 mg Intravenous Once    LORazepam (ATIVAN) 2 mg/mL injection 0 5 mg  0 5 mg Intravenous TID PRN    metoprolol tartrate (LOPRESSOR) tablet 25 mg  25 mg Oral Q12H ELIESER    NIFEdipine (PROCARDIA) capsule 20 mg  20 mg Oral Daily    ondansetron (ZOFRAN) injection 4 mg  4 mg Intravenous Q6H PRN    sodium chloride 0 9 % infusion  50 mL/hr Intravenous Continuous    vancomycin (VANCOCIN) 750 mg in sodium chloride 0 9 % 250 mL IV piggyback  12 5 mg/kg Intravenous Q24H     Medications Discontinued During This Encounter   Medication Reason    cefepime (MAXIPIME) IVPB (premix) 2,000 mg     cefepime (MAXIPIME) IVPB (premix) 2,000 mg     cefepime (MAXIPIME) IVPB (premix) 1,000 mg     pantoprazole (PROTONIX) EC tablet 40 mg     NISOLDIPINE PO     albuterol inhalation solution 2 5 mg     NIFEdipine (PROCARDIA) capsule 20 mg     lactated ringers bolus 1,000 mL     lactated ringers bolus 1,000 mL     atorvastatin (LIPITOR) tablet 20 mg     cefepime (MAXIPIME) IVPB (premix) 1,000 mg     vancomycin (VANCOCIN) 1,250 mg in sodium chloride 0 9 % 250 mL IVPB     levalbuterol (XOPENEX) inhalation solution 1 25 mg     sodium chloride 0 9 % inhalation solution 3 mL     oseltamivir (TAMIFLU) capsule 30 mg     vancomycin (VANCOCIN) oral solution 125 mg     metroNIDAZOLE (FLAGYL) IVPB (premix) 500 mg        Orville Singh, DO

## 2019-02-19 NOTE — PROGRESS NOTES
Progress Note - Dalila Odom 1940, 78 y o  female MRN: [id number]    Unit/Bed#: -01 Encounter: [id number]    Primary Care Provider: Tonya Alcantara MD   Date and time admitted to hospital: 2/16/2019  1:51 PM        * Severe sepsis [facility])  Assessment & Plan  · Improving  · Lactic acidosis resolved, patient currently afebrile, and leukocytosis improved  · Procalcitonin level elevated, will continue to trend  · Continue current antibiotics  · Cultures have been negative to date  · Secondary to bilateral pneumonia (healthcare associated) of suspected gram-negative      Pneumonia due to infectious organism  Assessment & Plan  · Differential diagnosis includes a healthcare associated pneumonia (suspected g negative etiology) versus an aspiration pneumonia in the setting of nausea and vomiting  · Blood cultures, sputum cultures, a urine testing for Legionella and Streptococcus antigen are negative to date  · Confirmatory testing for influenza and respiratory syncytial virus are negative  · Continue broad-spectrum antibiotics in the form of vancomycin, cefepime    Acute [facility] failure with hypoxia ([facility] )  Assessment & Plan  · Acute respiratory failure was likely secondary to pneumonia  · The patient presented with pulse ox of 85% on room air  · Continue supplemental oxygen as needed  · Appears to be improving    Anemia  Assessment & Plan  · Likely multifactorial  · Hemoglobin appears to be stable  · No signs of acute bleeding at this time  · Will continue to monitor  · Patient noted to have low iron level, will start supplements  · Transfuse as needed    JATINDER (acute kidney injury) ([facility] )  Assessment & Plan  · Suspected to be secondary to volume depletion and acute infection  · Patient has a history of chronic kidney disease-unclear what her baseline creatinine is  · Creatinine improving  · Will follow up with Nephrology  · Renal ultrasound appreciated    Hyperlipidemia  Assessment & Plan  · Statin on hold  · Resume at discharge    Chronic atrial fibrillation (HCC)  Assessment & Plan  · Rate controlled with metoprolol  · Continue anticoagulation with Coumadin  · INR therapeutic will continue to monitor  · Adjust Coumadin dosing accordingly      VTE Pharmacologic Prophylaxis: Pharmacologic: Warfarin (Coumadin)    Patient Centered Rounds: I have performed bedside rounds with nursing staff today  Discussions with Specialists or Other Care Team Provider: yes  Education and Discussions with Family / Patient: yes    Time Spent for Care: 30 minutes  More than 50% of total time spent on counseling and coordination of care as described above  Current Length of Stay: 3 day(s)    Current Patient Status: Inpatient   Certification Statement: The patient will continue to require additional inpatient hospital stay due to Sepsis    Discharge Plan:  Pending hospital course    Code Status: Level 3 - DNAR and DNI    Subjective:   No overnight events noted  Patient denies any pain at this time  Afebrile  Diarrhea improving    Objective:     Vitals:   Temp (24hrs), Av 8 °F (36 6 °C), Min:97 6 °F (36 4 °C), Max:98 °F (36 7 °C)    Temp:  [97 6 °F (36 4 °C)-98 °F (36 7 °C)] 97 6 °F (36 4 °C)  HR:  [27-68] 27  Resp:  [16-18] 18  BP: (106-181)/(60-95) 131/79  SpO2:  [92 %-97 %] 93 %  Body mass index is 19 3 kg/m²  Input and Output Summary (last 24 hours): Intake/Output Summary (Last 24 hours) at 2019 1510  Last data filed at 2019 1442  Gross per 24 hour   Intake 1322 5 ml   Output 1250 ml   Net 72 5 ml       Physical Exam:     Physical Exam   Constitutional: No distress  Frail elderly female   HENT:   Head: Normocephalic and atraumatic  Eyes: Conjunctivae and EOM are normal    Neck: Normal range of motion  Neck supple  Cardiovascular: Normal rate and regular rhythm  Pulmonary/Chest: Effort normal  No respiratory distress  Abdominal: Soft  She exhibits no distension  There is no tenderness  Neurological:   Left-sided weakness noted from history of CVA   Skin: Skin is warm and dry  Psychiatric: She has a normal mood and affect  Additional Data:     Labs:    Results from last 7 days   Lab Units 02/19/19  0528   WBC Thousand/uL 11 00*   HEMOGLOBIN g/dL 9 2*   HEMATOCRIT % 28 4*   PLATELETS Thousands/uL 243   NEUTROS PCT % 77*   LYMPHS PCT % 11*   MONOS PCT % 7   EOS PCT % 5     Results from last 7 days   Lab Units 02/19/19  0528  02/17/19  0558   POTASSIUM mmol/L 3 3*   < > 4 2   CHLORIDE mmol/L 117*   < > 111*   CO2 mmol/L 14*   < > 17*   BUN mg/dL 21   < > 34*   CREATININE mg/dL 1 66*   < > 2 36*   CALCIUM mg/dL 8 0*   < > 8 5*   ALK PHOS U/L  --   --  28*   ALT U/L  --   --  5*   AST U/L  --   --  13    < > = values in this interval not displayed  Results from last 7 days   Lab Units 02/18/19  0534   INR  2 88*               * I Have Reviewed All Lab Data Listed Above  * Additional Pertinent Lab Tests Reviewed: [place] [id number] Admission  Reviewed    Imaging:  Imaging Reports Reviewed Today Include:  No new imaging    Recent Cultures (last 7 days):     Results from last 7 days   Lab Units 02/17/19  1547 02/17/19  0234 02/16/19  1838 02/16/19  1558 02/16/19  1544   BLOOD CULTURE   --   --   --  No Growth at 48 hrs  No Growth at 48 hrs     INFLUENZA B PCR   --   --  Not Detected  --   --    RSV PCR   --   --  Not Detected  --   --    LEGIONELLA URINARY ANTIGEN   --  Negative  --   --   --    C DIFF TOXIN B  NEGATIVE for C difficle toxin by PCR    --   --   --   --        Last 24 Hours Medication List:     Current Facility-Administered Medications:  acetaminophen 650 mg Oral Q6H PRN Erna Habermann, MD    aspirin 81 mg Oral Daily Erna Habermann, MD    cefepime 1,000 mg Intravenous Q24H Erna Habermann, MD Last Rate: 1,000 mg (02/19/19 0052)   ferrous sulfate 325 mg Oral BID With Meals Erna Habermann, MD    ipratropium-albuterol 3 mL Nebulization Q6H Erna Habermann, MD    LORazepam 0 5 mg Intravenous Once Tomasa Guerra MD    LORazepam 0 5 mg Intravenous TID PRN Malick Hull PA-C    metoprolol tartrate 25 mg Oral Q12H 130 Latosha Schmidt MD    NIFEdipine 20 mg Oral Daily Tomasa Guerra MD    ondansetron 4 mg Intravenous Q6H PRN Tomasa Guerra MD    sodium bicarbonate infusion 75 mL/hr Intravenous Continuous Perez Bunk, DO Last Rate: 75 mL/hr (02/19/19 1022)   vancomycin 12 5 mg/kg Intravenous Q24H Tomasa Guerra MD Last Rate: 750 mg (02/18/19 1815)        Today, Patient Was Seen By: Tomasa Guerra MD    ** Please Note: Dictation voice to text software may have been used in the creation of this document   **

## 2019-02-19 NOTE — PHYSICAL THERAPY NOTE
02/19/19 1136   Pain Assessment   Pain Assessment 0-10   Pain Score No Pain   Restrictions/Precautions   Weight Bearing Precautions Per Order No   Braces or Orthoses LE Braces  (pt has LLE brace due to hemiparesis)   Other Precautions O2;Fall Risk;Telemetry;Multiple lines  (2L O2)   General   Chart Reviewed Yes   Family/Caregiver Present No   Cognition   Overall Cognitive Status WFL   Arousal/Participation Alert; Cooperative   Attention Within functional limits   Orientation Level Oriented to person;Oriented to time   Memory Within functional limits   Following Commands Follows one step commands with increased time or repetition   Comments pt agreed to PT treatment   Subjective   Subjective I'm so weak  I hope this cough goes away soon "   Bed Mobility   Supine to Sit 3  Moderate assistance   Additional items Assist x 1;HOB elevated; Increased time required;Verbal cues;LE management   Additional Comments pt sat at EOB x 25 minutes for sitting balance with vcs for proper posture & while performing B LE ex as noted   Transfers   Sit to Stand 2  Maximal assistance   Additional items Assist x 2; Increased time required;Verbal cues   Stand to Sit 2  Maximal assistance   Additional items Assist x 2; Increased time required;Verbal cues   Stand pivot 2  Maximal assistance   Additional items Assist x 2; Increased time required;Verbal cues   Additional Comments pt has difficulty progressing LEs to take steps to transfer bed to chair   Balance   Static Sitting Fair   Dynamic Sitting Fair -   Static Standing Poor   Dynamic Standing Poor -   Ambulatory Zero   Endurance Deficit   Endurance Deficit Yes   Endurance Deficit Description fatigued with activity   Activity Tolerance   Activity Tolerance Patient limited by fatigue   Exercises   Hip Flexion Sitting;20 reps;AROM; Right;AAROM; Left   Knee AROM Long Arc Quad Sitting;20 reps;AROM; Right;AAROM; Left   Ankle Pumps Sitting;20 reps;AROM; Right;PROM; Left   Assessment   Prognosis Fair Problem List Decreased strength;Decreased range of motion;Decreased endurance; Impaired balance;Decreased mobility; Decreased coordination; Impaired judgement;Decreased safety awareness; Impaired tone   Assessment Pt seen for PT treatment session this date with interventions consisting of therapeutic activity consisting of training: supine<>sit transfers, sit<>stand transfers, static sitting tolerance at EOB for 25 minutes w/ R UE support and stand pivot transfer bed to chair with max x 2 assist  Pt agreeable to PT treatment session upon arrival, pt found supine in bed w/ HOB elevated, in no apparent distress  In comparison to previous session, pt with improvements in sitting tolerance at EOB  Post session: all needs in reach seated in bedside chair, SCDs active  Continue to recommend STR at time of d/c in order to maximize pt's functional independence and safety w/ mobility  Pt continues to be functioning below baseline level, and remains limited 2* factors listed above and including decreased strength, endurance & safe functional mobility  PT will continue to see pt while here in order to address the deficits listed above and provide interventions consistent w/ POC in effort to achieve STGs  Goals   Patient Goals to get home   Treatment Day 2   Plan   Treatment/Interventions Functional transfer training;LE strengthening/ROM; Therapeutic exercise; Endurance training;Patient/family training;Equipment eval/education; Bed mobility;Gait training;Spoke to nursing   Progress Improving as expected   PT Frequency 5x/wk   Recommendation   Recommendation Short-term skilled PT   PT - OK to Discharge Yes  (when med stable to STR)   Additional Comments pt OOB in bedside chair all needs in reach, SCDs active post session & set up for lunch   Cherelle Condon, PTA

## 2019-02-19 NOTE — PLAN OF CARE
Problem: PHYSICAL THERAPY ADULT  Goal: Performs mobility at highest level of function for planned discharge setting  See evaluation for individualized goals  Description  Treatment/Interventions: Functional transfer training, LE strengthening/ROM, Therapeutic exercise, Endurance training, Equipment eval/education, Bed mobility, Gait training, Compensatory technique education, OT, Spoke to nursing     See flowsheet documentation for full assessment, interventions and recommendations    Virgen Sheth, PT     Outcome: Progressing

## 2019-02-19 NOTE — SPEECH THERAPY NOTE
Speech-Language Pathology Bedside Swallow Evaluation    Patient Name: Prashant Thornton    BAJNZ'N Date: 2/19/2019     Problem List  Patient Active Problem List   Diagnosis    Pneumonia due to infectious organism    Severe sepsis ([facility] )    Nausea vomiting and diarrhea    Chronic atrial fibrillation (HCC)    Hyperlipidemia    Acute respiratory failure with hypoxia ([facility] )    JATINDER (acute kidney injury) ([facility] )    Anemia       Past Medical History  Past Medical History:   Diagnosis Date    [facility]     Diverticulitis     Hypertension     Stroke [facility])        Past Surgical History  Past Surgical History:   Procedure Laterality Date    COLON SURGERY      COLOSTOMY      EYE SURGERY      REVISION COLOSTOMY         Summary   Pt presented with minimal oral dysphagia and functional appearing pharyngeal stage swallowing skills with materials administered today  Mastication of regular solid was prolonged but functional, with mild oral residual which cleared with liquid wash  No s/s aspiration with thin liquid  Pt has a congested cough at baseline but this did not worsen with PO intake  Offered diet downgrade for ease in mastication, however pt prefers to choose softer items from the menu instead  Risk for Aspiration: low    Recommendations: regular diet and thin liquids     Recommended Form of Meds: whole with liquid     Aspiration precautions and compensatory swallowing strategies: upright posture and only feed when fully alert      Current Medical Status per DIVINA Woodward H&P 2/16/2019  Prashant Thornton is a 78 y o  female who presents with 1 day history of nausea, vomiting, and diarrhea  Information was obtained from both patient and her daughter  Both patient and family live in [place] area  They are currently on vacation  Patient states that she has been feeling in her normal state of health up until last night around 2:00 a m   When she started to feel very nauseous and had multiple episodes of nonbloody vomiting with multiple episodes of diarrhea  Per daughter, patient was on a commode this a m  And had  "unresponsive/not answering questions" approx 30 seconds; subsequently, family call 911  Patient denies any fevers or chills  She denies any abdominal pain  She does have history of chronic diarrhea/durable bowel with history of colectomy in the past   However the diarrhea has been more severe over the past few weeks to the point that the patient lost control of her bowels  The patient would have 4-5 loose watery bowels on some days  Additionally, the patient has had coughing, moist but nonproductive, feeling increasing short of breath  Of note, patient was admitted to a local hospital in [place] approximately 1 month ago with complaint of chest heaviness and diarrhea  Per daughter, she was evaluated by Cardiology and was started on IV antibiotics for infectious diarrhea"  The family does not know whether stool culture was collected or C difficile was ruled out  Additionally the patient was having some coughing symptoms  Patient was discharged home  There were instructed to try Imodium for diarrhea which the daughter has been given the patient intermittently  The last dose of Imodium was 2 weeks ago  Patient has been doing fairly well and subsequently family brought her in [facility] for vacation  Patient has a colostomy which was reversed 25 years ago due to what the family described as obstruction  She has history of stage I colon cancer that was removed approximately 15 years ago  The last normal bowel movement was last Wednesday  Patient has a history of CVA with left hemeparesis  According to the family, the patient was taking off her Coumadin for a colonoscopy procedure 4 years ago and subsequently had a CVA  Past medical history:  Please see H&P for details    Special Studies:  CXR 2/17/2019 IMPRESSION:  No acute cardiopulmonary disease    Mild cardiomegaly is unchanged  Social/Education/Vocational Hx:  Pt lives with family      Swallow Information   Current Risks for Dysphagia & Aspiration: hx if CVA, r/o aspiration with possible PNA    Current Diet: regular diet and thin liquids      Baseline Diet: regular diet and thin liquids      Baseline Assessment   Behavior/Cognition: alert    Speech/Language Status: able to participate in conversation and able to follow commands    Patient Positioning: upright in bed    Pain Status/Interventions/Response to Interventions:   No report of or nonverbal indications of pain  Swallow Mechanism Exam   Facial: left facial droop  Labial: decreased ROM left side  Lingual: left sided tongue deviation  Velum: unable to visualize  Mandible: adequate ROM  Dentition: adequate  Vocal quality:weak   Volitional Cough: congested     Consistencies Assessed and Performance   Consistencies Administered: thin liquids, soft solids and hard solids  Specific materials administered included water, mandarin oranges, cheese/crackers    Oral Stage: minimal  Mastication was prolonged but functional with regular solids  Bolus formation and transfer weremildly reduced with mild oral residue noted  This cleared with liquid wash  No overt s/s reduced oral control  Pharyngeal Stage: WFL  Swallow Mechanics:  Swallowing initiation appeared prompt  Laryngeal rise was palpated and judged to be within functional limits  No coughing, throat clearing, change in vocal quality or respiratory status noted today  Esophageal Concerns: none reported      Summary and Recommendations (see above)    Results Reviewed with: patient and RN     Treatment Recommended: No additional ST f/u needed at this time

## 2019-02-20 LAB
ANION GAP SERPL CALCULATED.3IONS-SCNC: 9 MMOL/L (ref 4–13)
ARTERIAL PATENCY WRIST A: YES
BASE EXCESS BLDA CALC-SCNC: -9.4 MMOL/L (ref -2–3)
BASOPHILS # BLD AUTO: 0.1 THOUSANDS/ΜL (ref 0–0.1)
BASOPHILS NFR BLD AUTO: 1 % (ref 0–2)
BETA-HYDROXYBUTYRATE: 1.51 MMOL/L (ref 0.02–0.27)
BUN SERPL-MCNC: 17 MG/DL (ref 7–25)
CALCIUM SERPL-MCNC: 7.7 MG/DL (ref 8.6–10.5)
CHLORIDE SERPL-SCNC: 116 MMOL/L (ref 98–107)
CO2 SERPL-SCNC: 13 MMOL/L (ref 21–31)
CREAT SERPL-MCNC: 1.51 MG/DL (ref 0.6–1.2)
EOSINOPHIL # BLD AUTO: 0.3 THOUSAND/ΜL (ref 0–0.61)
EOSINOPHIL NFR BLD AUTO: 4 % (ref 0–5)
ERYTHROCYTE [DISTWIDTH] IN BLOOD BY AUTOMATED COUNT: 15.2 % (ref 11.5–14.5)
GFR SERPL CREATININE-BSD FRML MDRD: 32 ML/MIN/1.73SQ M
GLUCOSE SERPL-MCNC: 67 MG/DL (ref 65–99)
HCO3 BLDA-SCNC: 13.4 MMOL/L (ref 22–28)
HCT VFR BLD AUTO: 27.5 % (ref 42–47)
HGB BLD-MCNC: 9.2 G/DL (ref 12–16)
INR PPP: 2.22 (ref 0.9–1.5)
LACTATE SERPL-SCNC: 1 MMOL/L (ref 0.5–2)
LYMPHOCYTES # BLD AUTO: 1.2 THOUSANDS/ΜL (ref 0.6–4.47)
LYMPHOCYTES NFR BLD AUTO: 14 % (ref 21–51)
MCH RBC QN AUTO: 29.9 PG (ref 26–34)
MCHC RBC AUTO-ENTMCNC: 33.3 G/DL (ref 31–37)
MCV RBC AUTO: 90 FL (ref 81–99)
MONOCYTES # BLD AUTO: 0.8 THOUSAND/ΜL (ref 0.17–1.22)
MONOCYTES NFR BLD AUTO: 9 % (ref 2–12)
NASAL CANNULA: ABNORMAL
NEUTROPHILS # BLD AUTO: 6.5 THOUSANDS/ΜL (ref 1.4–6.5)
NEUTS SEG NFR BLD AUTO: 73 % (ref 42–75)
NRBC BLD AUTO-RTO: 0 /100 WBCS
O2 CT BLDA-SCNC: 12.1 ML/DL
OXYHGB MFR BLDA: 95.3 % (ref 94–100)
PCO2 BLDA: 20.4 MM HG (ref 35–45)
PH BLDA: 7.43 [PH] (ref 7.35–7.45)
PLATELET # BLD AUTO: 242 THOUSANDS/UL (ref 149–390)
PMV BLD AUTO: 8.7 FL (ref 8.6–11.7)
PO2 BLDA: 78 MM HG (ref 80–100)
POTASSIUM SERPL-SCNC: 3.6 MMOL/L (ref 3.5–5.5)
PROCALCITONIN SERPL-MCNC: 2.94 NG/ML
PROTHROMBIN TIME: 26 SECONDS (ref 10.2–13)
RBC # BLD AUTO: 3.06 MILLION/UL (ref 3.9–5.2)
SODIUM SERPL-SCNC: 138 MMOL/L (ref 134–143)
SPECIMEN SOURCE: ABNORMAL
WBC # BLD AUTO: 8.9 THOUSAND/UL (ref 4.8–10.8)

## 2019-02-20 PROCEDURE — 97530 THERAPEUTIC ACTIVITIES: CPT

## 2019-02-20 PROCEDURE — 94668 MNPJ CHEST WALL SBSQ: CPT

## 2019-02-20 PROCEDURE — 80048 BASIC METABOLIC PNL TOTAL CA: CPT | Performed by: INTERNAL MEDICINE

## 2019-02-20 PROCEDURE — 94664 DEMO&/EVAL PT USE INHALER: CPT

## 2019-02-20 PROCEDURE — 85610 PROTHROMBIN TIME: CPT | Performed by: INTERNAL MEDICINE

## 2019-02-20 PROCEDURE — 99232 SBSQ HOSP IP/OBS MODERATE 35: CPT | Performed by: INTERNAL MEDICINE

## 2019-02-20 PROCEDURE — 82805 BLOOD GASES W/O2 SATURATION: CPT | Performed by: INTERNAL MEDICINE

## 2019-02-20 PROCEDURE — 82010 KETONE BODYS QUAN: CPT | Performed by: INTERNAL MEDICINE

## 2019-02-20 PROCEDURE — 84145 PROCALCITONIN (PCT): CPT | Performed by: INTERNAL MEDICINE

## 2019-02-20 PROCEDURE — 94760 N-INVAS EAR/PLS OXIMETRY 1: CPT

## 2019-02-20 PROCEDURE — 94640 AIRWAY INHALATION TREATMENT: CPT

## 2019-02-20 PROCEDURE — 83605 ASSAY OF LACTIC ACID: CPT | Performed by: INTERNAL MEDICINE

## 2019-02-20 PROCEDURE — 85025 COMPLETE CBC W/AUTO DIFF WBC: CPT | Performed by: INTERNAL MEDICINE

## 2019-02-20 PROCEDURE — 36600 WITHDRAWAL OF ARTERIAL BLOOD: CPT

## 2019-02-20 RX ORDER — LOPERAMIDE HYDROCHLORIDE 2 MG/1
2 CAPSULE ORAL ONCE
Status: COMPLETED | OUTPATIENT
Start: 2019-02-20 | End: 2019-02-20

## 2019-02-20 RX ORDER — WARFARIN SODIUM 1 MG/1
2 TABLET ORAL
Status: DISCONTINUED | OUTPATIENT
Start: 2019-02-20 | End: 2019-02-21 | Stop reason: HOSPADM

## 2019-02-20 RX ADMIN — METOPROLOL TARTRATE 25 MG: 25 TABLET, FILM COATED ORAL at 08:29

## 2019-02-20 RX ADMIN — NIFEDIPINE 20 MG: 10 CAPSULE ORAL at 08:29

## 2019-02-20 RX ADMIN — FERROUS SULFATE TAB 325 MG (65 MG ELEMENTAL FE) 325 MG: 325 (65 FE) TAB at 08:29

## 2019-02-20 RX ADMIN — FERROUS SULFATE TAB 325 MG (65 MG ELEMENTAL FE) 325 MG: 325 (65 FE) TAB at 16:06

## 2019-02-20 RX ADMIN — CEFEPIME HYDROCHLORIDE 1000 MG: 1 INJECTION, SOLUTION INTRAVENOUS at 01:07

## 2019-02-20 RX ADMIN — METOPROLOL TARTRATE 25 MG: 25 TABLET, FILM COATED ORAL at 23:57

## 2019-02-20 RX ADMIN — IPRATROPIUM BROMIDE AND ALBUTEROL SULFATE 3 ML: 2.5; .5 SOLUTION RESPIRATORY (INHALATION) at 07:24

## 2019-02-20 RX ADMIN — Medication 125 ML/HR: at 10:27

## 2019-02-20 RX ADMIN — IPRATROPIUM BROMIDE AND ALBUTEROL SULFATE 3 ML: 2.5; .5 SOLUTION RESPIRATORY (INHALATION) at 19:25

## 2019-02-20 RX ADMIN — LOPERAMIDE HYDROCHLORIDE 2 MG: 2 CAPSULE ORAL at 10:30

## 2019-02-20 RX ADMIN — IPRATROPIUM BROMIDE AND ALBUTEROL SULFATE 3 ML: 2.5; .5 SOLUTION RESPIRATORY (INHALATION) at 14:23

## 2019-02-20 RX ADMIN — WARFARIN SODIUM 2 MG: 1 TABLET ORAL at 17:40

## 2019-02-20 RX ADMIN — ASPIRIN 81 MG: 81 TABLET, COATED ORAL at 08:29

## 2019-02-20 RX ADMIN — Medication 125 ML/HR: at 23:45

## 2019-02-20 NOTE — PLAN OF CARE
Problem: OCCUPATIONAL THERAPY ADULT  Goal: Performs self-care activities at highest level of function for planned discharge setting  See evaluation for individualized goals  Description  Treatment Interventions: ADL retraining, Functional transfer training, UE strengthening/ROM, Endurance training, Patient/family training, Neuromuscular reeducation, Energy conservation     See flowsheet documentation for full assessment, interventions and recommendations      Outcome: [address], GARCIA/L

## 2019-02-20 NOTE — PROGRESS NOTES
Progress Note - Jazzy Ornelas 1940, 78 y o  female MRN: [id number]    Unit/Bed#: -01 Encounter: [id number]    Primary Care Provider: Ammy Larios MD   Date and time admitted to hospital: 2/16/2019  1:51 PM        * Severe sepsis [facility])  Assessment & Plan  · Improving  · Lactic acidosis resolved, patient currently afebrile, and leukocytosis improved  · Procalcitonin level improving  · Continue current antibiotics  · Cultures have been negative to date  · Secondary to bilateral pneumonia (healthcare associated) of suspected gram-negative      Pneumonia due to infectious organism  Assessment & Plan  · Differential diagnosis includes a healthcare associated pneumonia (suspected g negative etiology) versus an aspiration pneumonia in the setting of nausea and vomiting  · Blood cultures, sputum cultures, a urine testing for Legionella and Streptococcus antigen are negative to date  · Confirmatory testing for influenza and respiratory syncytial virus are negative  · Continue broad-spectrum antibiotics in the form of vancomycin, cefepime    Acute respiratory failure with hypoxia ([facility] )  Assessment & Plan  · Acute respiratory failure was likely secondary to pneumonia  · The patient presented with pulse ox of 85% on room air  · Continue supplemental oxygen as needed  · Appears to be improving    Anemia  Assessment & Plan  · Likely multifactorial  · Hemoglobin appears to be stable  · No signs of acute bleeding at this time  · Will continue to monitor  · Patient noted to have low iron level, will start supplements  · Transfuse as needed    Metabolic acidosis  Assessment & Plan  · Continue bicarb drip  · Nephrology input appreciated  · Possibly secondary to starvation ketosis  · Will encourage increased p o   Intake and supplements  · Lactate within normal limits  · ABG appreciated pH within normal limits    JATINDER (acute kidney injury) ([facility] )  Assessment & Plan  · Suspected to be secondary to volume depletion and acute infection  · Patient has a history of chronic kidney disease-unclear what her baseline creatinine is  · Creatinine improving  · Will follow up with Nephrology  · Renal ultrasound appreciated    Chronic atrial fibrillation ([address] )  Assessment & Plan  · Rate controlled with metoprolol  · Continue anticoagulation with Coumadin  · INR therapeutic will continue to monitor  · Adjust Coumadin dosing accordingly      VTE Pharmacologic Prophylaxis: Pharmacologic: Warfarin (Coumadin)    Patient Centered Rounds: I have performed bedside rounds with nursing staff today  Discussions with Specialists or Other Care Team Provider: yes  Education and Discussions with Family / Patient: yes    Time Spent for Care: 30 minutes  More than 50% of total time spent on counseling and coordination of care as described above  Current Length of Stay: 4 day(s)    Current Patient Status: Inpatient   Certification Statement: The patient will continue to require additional inpatient hospital stay due to Metabolic acidosis    Discharge Plan:  Pending hospital course    Code Status: Level 3 - DNAR and DNI    Subjective:   Still with intermittent diarrhea  Denies any fever or chills  No abdominal pain  Patient is tolerating diet  Objective:     Vitals:   Temp (24hrs), Av 3 °F (37 4 °C), Min:98 8 °F (37 1 °C), Max:99 8 °F (37 7 °C)    Temp:  [98 8 °F (37 1 °C)-99 8 °F (37 7 °C)] 99 8 °F (37 7 °C)  HR:  [55-92] 72  Resp:  [16-28] 16  BP: (115-132)/(65-90) 115/90  SpO2:  [95 %-99 %] 98 %  Body mass index is 19 3 kg/m²  Input and Output Summary (last 24 hours): Intake/Output Summary (Last 24 hours) at 2019 1519  Last data filed at 2019 1759  Gross per 24 hour   Intake 120 ml   Output    Net 120 ml       Physical Exam:     Physical Exam   Constitutional:   Frail elderly female   HENT:   Head: Normocephalic and atraumatic  Eyes: Conjunctivae and EOM are normal    Neck: Normal range of motion     Cardiovascular: Normal rate and regular rhythm  Pulmonary/Chest: Effort normal  No respiratory distress  She has rales  Abdominal: Soft  She exhibits no distension  There is no tenderness  Musculoskeletal:   Trace edema lower extremities   Neurological: She is alert  Left-sided weakness noted   Skin: Skin is warm and dry  Psychiatric: She has a normal mood and affect  Additional Data:     Labs:    Results from last 7 days   Lab Units 02/20/19  0518   WBC Thousand/uL 8 90   HEMOGLOBIN g/dL 9 2*   HEMATOCRIT % 27 5*   PLATELETS Thousands/uL 242   NEUTROS PCT % 73   LYMPHS PCT % 14*   MONOS PCT % 9   EOS PCT % 4     Results from last 7 days   Lab Units 02/20/19  0518  02/17/19  0558   POTASSIUM mmol/L 3 6   < > 4 2   CHLORIDE mmol/L 116*   < > 111*   CO2 mmol/L 13*   < > 17*   BUN mg/dL 17   < > 34*   CREATININE mg/dL 1 51*   < > 2 36*   CALCIUM mg/dL 7 7*   < > 8 5*   ALK PHOS U/L  --   --  28*   ALT U/L  --   --  5*   AST U/L  --   --  13    < > = values in this interval not displayed  Results from last 7 days   Lab Units 02/20/19  0518   INR  2 22*               * I Have Reviewed All Lab Data Listed Above  * Additional Pertinent Lab Tests Reviewed: [facility]  Reviewed    Imaging:  Imaging Reports Reviewed Today Include:  No new imaging    Recent Cultures (last 7 days):     Results from last 7 days   Lab Units 02/17/19  1547 02/17/19  0234 02/16/19  1838 02/16/19  1558 02/16/19  1544   BLOOD CULTURE   --   --   --  No Growth at 72 hrs  No Growth at 72 hrs     INFLUENZA B PCR   --   --  Not Detected  --   --    RSV PCR   --   --  Not Detected  --   --    LEGIONELLA URINARY ANTIGEN   --  Negative  --   --   --    C DIFF TOXIN B  NEGATIVE for C difficle toxin by PCR    --   --   --   --        Last 24 Hours Medication List:     Current Facility-Administered Medications:  acetaminophen 650 mg Oral Q6H PRN Kevin Betts MD    aspirin 81 mg Oral Daily Kevin Betts MD    cefepime 1,000 mg Intravenous Q24H Thania Boyce MD Last Rate: 1,000 mg (02/20/19 0107)   ferrous sulfate 325 mg Oral BID With Meals Thania Boyce MD    ipratropium-albuterol 3 mL Nebulization Q6H While awake Thania Boyce MD    LORazepam 0 5 mg Intravenous Once Thania Boyce MD    LORazepam 0 5 mg Intravenous TID PRN Jennifer Rodriguez PA-C    metoclopramide 10 mg Intravenous Q6H PRN DIVINA Palafox    metoprolol tartrate 25 mg Oral Q12H Katia Fernandez MD    NIFEdipine 20 mg Oral Daily Thania Boyce MD    ondansetron 4 mg Intravenous Q6H PRN Thania Boyce MD    sodium bicarbonate infusion 125 mL/hr Intravenous Continuous Kensammy Bolds, DO Last Rate: 125 mL/hr (02/20/19 1027)   warfarin 2 mg Oral Daily (warfarin) Thania Boyce MD         Today, Patient Was Seen By: Thania Boyce MD    ** Please Note: Dictation voice to text software may have been used in the creation of this document   **

## 2019-02-20 NOTE — PROGRESS NOTES
Vancomycin IV Pharmacy-to-Dose Consultation    Melchor Drake is a 78 y o  female who is currently receiving Vancomycin IV with management by the Pharmacy Consult service  Assessment/Plan:  The patient was reviewed  Renal function is stable and no signs or symptoms of nephrotoxicity and/or infusion reactions were documented in the chart  Based on today?s assessment, continue current vancomycin (day # 5) dosing of 750mg IV Q 24hrs, with a plan for trough to be drawn at 1700 on 12/21  We will continue to follow the patient?s culture results and clinical progress daily      Cruz Reddy, Pharmacist

## 2019-02-20 NOTE — PROGRESS NOTES
Patient OOB with PT and RN to chair for 1 hour  Patient then wanted to be placed back in bed  Patient max assist x 1 back to bed

## 2019-02-20 NOTE — OCCUPATIONAL THERAPY NOTE
02/20/19 0954   Restrictions/Precautions   Weight Bearing Precautions Per Order No   Other Precautions Multiple lines; Fall Risk   Pain Assessment   Pain Assessment No/denies pain   ADL   Equipment Provided Other (Comment)   Grooming Comments patient wiped face with washcloth provided, post transfers   Bed Mobility   Rolling R 3  Moderate assistance   Additional items Assist x 2;Bedrails; Increased time required;Verbal cues;LE management   Supine to Sit 3  Moderate assistance   Additional items Assist x 2;HOB elevated; Bedrails; Increased time required;Verbal cues;LE management   Transfers   Sit to Stand 2  Maximal assistance   Additional items Assist x 2; Increased time required;Verbal cues   Stand to Sit 2  Maximal assistance   Additional items Assist x 2; Increased time required;Verbal cues   Stand pivot 2  Maximal assistance   Additional items Assist x 2; Increased time required;Verbal cues   Cognition   Overall Cognitive Status [facility]   Arousal/Participation Alert; Cooperative   Comments pt  was pleasant but upset re her diarrhea (asking " will this ever go away? "   Activity Tolerance   Activity Tolerance Patient limited by fatigue;Treatment limited secondary to medical complications (Comment)   Assessment   Assessment Patient participated in Skilled OT session this date with interventions consisting of safety awareness and fall prevention techniques and  therapeutic activities to: increase activity tolerance   Patient agreeable to OT treatment session, upon arrival patient was found seated in bed - transferred OOB to chair during session  Patient requiring frequent rest periods and ocassional safety reminders and max assist with most functional mobility today  Patient continues to be functioning below baseline level, occupational performance remains limited secondary to factors listed above and increased risk for falls and injury    Patient to benefit from continued Occupational Therapy treatment while in the [facility] to address deficits as defined above and maximize level of functional independence with ADLs and functional mobility  Plan   Treatment Interventions Functional transfer training;Patient/family training; Activityengagement   Goal Expiration Date 02/27/19   Treatment Day 172 RDAHA Escalera/L

## 2019-02-20 NOTE — CONSULTS
The patient's vancomycin therapy has been discontinued  Thank you for the consult  Pharmacy will sign off now

## 2019-02-20 NOTE — PHYSICAL THERAPY NOTE
02/20/19 0949   Pain Assessment   Pain Assessment 0-10   Pain Score No Pain   Restrictions/Precautions   Weight Bearing Precautions Per Order No   Braces or Orthoses LE Braces  (pt has LLE brace for hemiparesis)   Other Precautions Multiple lines;Telemetry;O2;Fall Risk   General   Chart Reviewed Yes   Family/Caregiver Present No   Cognition   Overall Cognitive Status WFL   Arousal/Participation Alert; Cooperative   Attention Within functional limits   Orientation Level Oriented to person;Oriented to time   Memory Within functional limits   Following Commands Follows one step commands with increased time or repetition   Comments pt agreed to PT treatment   Subjective   Subjective "Will this diarrhea ever stop?"   Bed Mobility   Rolling R 3  Moderate assistance   Additional items Assist x 2;Bedrails; Increased time required;Verbal cues;LE management   Supine to Sit 3  Moderate assistance   Additional items Assist x 2;HOB elevated; Bedrails; Increased time required;Verbal cues;LE management   Transfers   Sit to Stand 2  Maximal assistance   Additional items Assist x 2; Increased time required;Verbal cues   Stand to Sit 2  Maximal assistance   Additional items Assist x 2; Increased time required;Verbal cues   Stand pivot 2  Maximal assistance   Additional items Assist x 2; Increased time required;Verbal cues   Additional Comments stood x 3 mins x 1 & 1 min x 1 with max x 2 for pericare due to bowel incontinence   Balance   Static Sitting Fair   Dynamic Sitting Fair -   Static Standing Poor   Dynamic Standing Poor -   Ambulatory Zero   Endurance Deficit   Endurance Deficit Yes   Endurance Deficit Description fatigued with activity   Activity Tolerance   Activity Tolerance Patient limited by fatigue   Nurse Made Aware yes JAYA Lira present   Assessment   Prognosis Fair   Problem List Decreased strength;Decreased range of motion;Decreased endurance; Impaired balance;Decreased mobility; Decreased coordination; Impaired judgement;Decreased safety awareness; Impaired tone   Assessment Pt seen for PT treatment session this date with interventions consisting of therapeutic activity consisting of training: bed mobility, supine<>sit transfers, sit<>stand transfers, static standing tolerance for 3 x1 &1x1 minutes w/ R UE support and stand pivot transfer bed to chair max x 2   Pt agreeable to PT treatment session upon arrival, pt found supine in bed w/ HOB elevated, in no apparent distress  In comparison to previous session, pt with improvements in standing tolerance  Post session: all needs in reach OOB in chair, SCDs active  Continue to recommend STR at time of d/c in order to maximize pt's functional independence and safety w/ mobility  Pt continues to be functioning below baseline level, and remains limited 2* factors listed above and including decreased strength, endurance & safe functional mobility  PT will continue to see pt while here in order to address the deficits listed above and provide interventions consistent w/ POC in effort to achieve STGs  Goals   Patient Goals to have the diarrhea stop   Treatment Day 3   Plan   Treatment/Interventions Functional transfer training;LE strengthening/ROM; Therapeutic exercise; Endurance training;Patient/family training;Equipment eval/education; Bed mobility;Gait training;Spoke to nursing   Progress Improving as expected   PT Frequency 5x/wk   Recommendation   Recommendation Short-term skilled PT   PT - OK to Discharge Yes  (when med stable to STR)   Additional Comments pt OOB in chair all needs in reach, SCDs active post session   Arnold Flatness, PTA

## 2019-02-20 NOTE — PLAN OF CARE
Problem: Potential for Falls  Goal: Patient will remain free of falls  Description  INTERVENTIONS:  - Assess patient frequently for physical needs  -  Identify cognitive and physical deficits and behaviors that affect risk of falls  -  [place] fall precautions as indicated by assessment   - Educate patient/family on patient safety including physical limitations  - Instruct patient to call for assistance with activity based on assessment  - Modify environment to reduce risk of injury  - Consider OT/PT consult to assist with strengthening/mobility  Outcome: Progressing     Problem: Prexisting or High Potential for Compromised Skin Integrity  Goal: Skin integrity is maintained or improved  Description  INTERVENTIONS:  - Identify patients at risk for skin breakdown  - Assess and monitor skin integrity  - Assess and monitor nutrition and hydration status  - Monitor labs (i e  albumin)  - Assess for incontinence   - Turn and reposition patient  - Assist with mobility/ambulation  - Relieve pressure over bony prominences  - Avoid friction and shearing  - Provide appropriate hygiene as needed including keeping skin clean and dry  - Evaluate need for skin moisturizer/barrier cream  - Collaborate with interdisciplinary team (i e  Nutrition, Rehabilitation, etc )   - Patient/family teaching  Outcome: Progressing     Problem: CARDIOVASCULAR - ADULT  Goal: Maintains optimal cardiac output and hemodynamic stability  Description  INTERVENTIONS:  - Monitor I/O, vital signs and rhythm  - Monitor for S/S and trends of decreased cardiac output i e  bleeding, hypotension  - Administer and titrate ordered vasoactive medications to optimize hemodynamic stability  - Assess quality of pulses, skin color and temperature  - Assess for signs of decreased coronary artery perfusion - ex   Angina  - Instruct patient to report change in severity of symptoms  Outcome: Progressing  Goal: Absence of cardiac dysrhythmias or at baseline rhythm  Description  INTERVENTIONS:  - Continuous cardiac monitoring, monitor vital signs, obtain 12 lead EKG if indicated  - Administer antiarrhythmic and heart rate control medications as ordered  - Monitor electrolytes and administer replacement therapy as ordered  Outcome: Progressing     Problem: RESPIRATORY - ADULT  Goal: Achieves optimal ventilation and oxygenation  Description  INTERVENTIONS:  - Assess for changes in respiratory status  - Assess for changes in mentation and behavior  - Position to facilitate oxygenation and minimize respiratory effort  - Oxygen administration by appropriate delivery method based on oxygen saturation (per order) or ABGs  - Initiate smoking cessation education as indicated  - Encourage broncho-pulmonary hygiene including cough, deep breathe, Incentive Spirometry  - Assess the need for suctioning and aspirate as needed  - Assess and instruct to report SOB or any respiratory difficulty  - Respiratory Therapy support as indicated  Outcome: Progressing     Problem: MUSCULOSKELETAL - ADULT  Goal: Maintain or return mobility to safest level of function  Description  INTERVENTIONS:  - Assess patient's ability to carry out ADLs; assess patient's baseline for ADL function and identify physical deficits which impact ability to perform ADLs (bathing, care of mouth/teeth, toileting, grooming, dressing, etc )  - Assess/evaluate cause of self-care deficits   - Assess range of motion  - Assess patient's mobility; develop plan if impaired  - Assess patient's need for assistive devices and provide as appropriate  - Encourage maximum independence but intervene and supervise when necessary  - Involve family in performance of ADLs  - Assess for home care needs following discharge   - Request OT consult to assist with ADL evaluation and planning for discharge  - Provide patient education as appropriate  Outcome: Progressing  Goal: Maintain proper alignment of affected body part  Description  INTERVENTIONS:  - Support, maintain and protect limb and body alignment  - Provide pt/fam with appropriate education  Outcome: Progressing     Problem: DISCHARGE PLANNING - CARE MANAGEMENT  Goal: Discharge to post-acute care or home with appropriate resources  Description  INTERVENTIONS:  - Conduct assessment to determine patient/family and health care team treatment goals, and need for post-acute services based on payer coverage, community resources, and patient preferences, and barriers to discharge  - Address psychosocial, clinical, and financial barriers to discharge as identified in assessment in conjunction with the patient/family and health care team  - Arrange appropriate level of post-acute services according to patient?s   needs and preference and payer coverage in collaboration with the physician and health care team  - Communicate with and update the patient/family, physician, and health care team regarding progress on the discharge plan  - Arrange appropriate transportation to post-acute venues  Outcome: Progressing     Problem: Nutrition/Hydration-ADULT  Goal: Nutrient/Hydration intake appropriate for improving, restoring or maintaining nutritional needs  Description  Monitor and assess patient's nutrition/hydration status for malnutrition (ex- brittle hair, bruises, dry skin, pale skin and conjunctiva, muscle wasting, smooth red tongue, and disorientation)  Collaborate with interdisciplinary team and initiate plan and interventions as ordered  Monitor patient's weight and dietary intake as ordered or per policy  Utilize nutrition screening tool and intervene per policy  Determine patient's food preferences and provide high-protein, high-caloric foods as appropriate       INTERVENTIONS:  - Monitor oral intake, urinary output, labs, and treatment plans  - Assess nutrition and hydration status and recommend course of action  - Evaluate amount of meals eaten  - Assist patient with eating if necessary   - Allow adequate time for meals  - Recommend/ encourage appropriate diets, oral nutritional supplements, and vitamin/mineral supplements  - Order, calculate, and assess calorie counts as needed  - Recommend, monitor, and adjust tube feedings and TPN/PPN based on assessed needs  - Assess need for intravenous fluids  - Provide specific nutrition/hydration education as appropriate  - Include patient/family/caregiver in decisions related to nutrition  Outcome: Progressing

## 2019-02-20 NOTE — PLAN OF CARE
Problem: PHYSICAL THERAPY ADULT  Goal: Performs mobility at highest level of function for planned discharge setting  See evaluation for individualized goals  Description  Treatment/Interventions: Functional transfer training, LE strengthening/ROM, Therapeutic exercise, Endurance training, Equipment eval/education, Bed mobility, Gait training, Compensatory technique education, OT, Spoke to nursing     See flowsheet documentation for full assessment, interventions and recommendations    Erik Boyce, PT     Outcome: Progressing

## 2019-02-20 NOTE — PROGRESS NOTES
Progress Note - Nephrology   Casandra Lawson 78 y o  female MRN: [id number]  Unit/Bed#: -01 Encounter: [id number]    A/P:  1  A/P:  1  Acute kidney injury atop chronic kidney disease               continue with IV fluid volume expansion the form of isotonic saline  Will change the IV fluids, please refer below as I will need to increase the patient's bicarbonate concentration  2  Chronic kidney disease stage IIIA with baseline creatinine about 1 mg/dL  3  Chronic tubulointerstitial disease likely  4  Acidosis   Patient bicarbonate worsening this morning, it is down to 30 millimole/L, potassium level is appropriate this morning, will check a lactic acid, arterial blood gas, and a beta hydroxybutyrate to rule out other cannot causes  At this point most likely etiology of the worsening acidosis is GI losses due to diarrhea  I would change the patient's IV fluids from half-normal saline with 75 mEq per L of sodium bicarbonate to D5W to 150 mEq per L of sodium bicarbonate  Rate the will be set at 125 mL an hour for now, this may be decreased as the patient's diarrhea improves  5  Lactic acidosis              reassess lactic acid this morning  Anticipate that is appropriate  6  Severe sepsis due to healthcare associated pneumonia with possible gastroenteritis               resolved at this time  7  Possible dysphagia         Follow up reason for today's visit:  Acute renal failure/chronic kidney disease/acid-base disorder    Severe sepsis [facility])    Patient Active Problem List   Diagnosis    Pneumonia due to infectious organism    Severe sepsis ([facility] )    Nausea vomiting and diarrhea    Chronic atrial fibrillation (HCC)    Hyperlipidemia    Acute respiratory failure with hypoxia ([address] )    JATINDER (acute kidney injury) ([facility] [address] )    Anemia         Subjective:   Patient continues to have copious amounts of diarrhea, she cannot recall how many times she went to the toilet for separate bowel movements      Objective: Vitals: Blood pressure 123/65, pulse 80, temperature 99 3 °F (37 4 °C), temperature source Tympanic, resp  rate 18, height 5' 8" (1 727 m), weight 57 6 kg (126 lb 14 4 oz), SpO2 95 %  ,Body mass index is 19 3 kg/m²  Weight (last 2 days)     None            Intake/Output Summary (Last 24 hours) at 2/20/2019 0907  Last data filed at 2/19/2019 1759  Gross per 24 hour   Intake 120 ml   Output 600 ml   Net -480 ml     I/O last 3 completed shifts: In: 442 5 [P O :240; I V :102 5; IV Piggyback:100]  Out: 1250 [Urine:1250]    Urethral Catheter Non-latex 16 Fr   (Active)   Amt returned on insertion(mL) 100 mL 2/16/2019 11:01 PM   Collection Container Standard drainage bag 2/17/2019  9:00 PM   Securement Method Securing device (Describe) 2/17/2019  9:00 PM   Output (mL) 350 mL 2/19/2019  5:21 AM       Physical Exam: /65 (BP Location: Right arm)   Pulse 80   Temp 99 3 °F (37 4 °C) (Tympanic)   Resp 18   Ht 5' 8" (1 727 m)   Wt 57 6 kg (126 lb 14 4 oz)   SpO2 95%   BMI 19 30 kg/m²     General Appearance:    Alert, cooperative, no distress, appears stated age   Head:    Normocephalic, without obvious abnormality, atraumatic   Eyes:    Conjunctiva/corneas clear   Ears:    Normal external ears   Nose:   Nares normal, septum midline, mucosa normal, no drainage    or sinus tenderness   Throat:   Lips, mucosa, and tongue normal; teeth and gums normal   Neck:   Supple   Back:     Symmetric, no curvature, ROM normal, no CVA tenderness   Lungs:     Reduced, some alveolar crackles appreciated with upper airway sounds   Chest wall:    No tenderness or deformity   Heart:    Regular rate and rhythm, S1 and S2 normal, no murmur, rub   or gallop   Abdomen:     Soft, non-tender, bowel sounds active   Extremities:   Extremities normal, atraumatic, no cyanosis or edema   Skin:   Skin color, texture, turgor normal, no rashes or lesions   Lymph nodes:   Cervical normal   Neurologic:   CNII-XII intact            Lab, Imaging and other studies: I have personally reviewed pertinent labs  CBC:   Lab Results   Component Value Date    WBC 8 90 02/20/2019    HGB 9 2 (L) 02/20/2019    HCT 27 5 (L) 02/20/2019    MCV 90 02/20/2019     02/20/2019    MCH 29 9 02/20/2019    MCHC 33 3 02/20/2019    RDW 15 2 (H) 02/20/2019    MPV 8 7 02/20/2019    NRBC 0 02/20/2019     CMP:   Lab Results   Component Value Date    K 3 6 02/20/2019     (H) 02/20/2019    CO2 13 (L) 02/20/2019    BUN 17 02/20/2019    CREATININE 1 51 (H) 02/20/2019    CALCIUM 7 7 (L) 02/20/2019    EGFR 32 02/20/2019         Results from last 7 days   Lab Units 02/20/19  0518 02/19/19  0528 02/18/19  0534 02/17/19  0558 02/16/19  1433   POTASSIUM mmol/L 3 6 3 3* 3 6 4 2 4 2   CHLORIDE mmol/L 116* 117* 115* 111* 109*   CO2 mmol/L 13* 14* 16* 17* 18*   BUN mg/dL 17 21 28* 34* 39*   CREATININE mg/dL 1 51* 1 66* 1 94* 2 36* 2 77*   CALCIUM mg/dL 7 7* 8 0* 7 8* 8 5* 9 6   ALK PHOS U/L  --   --   --  28* 38*   ALT U/L  --   --   --  5* 4*   AST U/L  --   --   --  13 12*         Phosphorus: No results found for: PHOS  Magnesium: No results found for: MG  Urinalysis: No results found for: COLORU, CLARITYU, SPECGRAV, PHUR, LEUKOCYTESUR, NITRITE, PROTEINUA, GLUCOSEU, KETONESU, BILIRUBINUR, BLOODU  Ionized Calcium: No results found for: CAION  Coagulation:   Lab Results   Component Value Date    INR 2 22 (H) 02/20/2019     Troponin: No results found for: TROPONINI  ABG: No results found for: PHART, COR1UKC, PO2ART, WLQ2UAN, L8ODUYJG, BEART, SOURCE  Radiology review:     IMAGING  Procedure: Ct Abdomen Pelvis Wo Contrast    Result Date: 2/16/2019  Narrative: CT ABDOMEN AND PELVIS WITHOUT IV CONTRAST INDICATION:   49-year-old woman presenting with abdominal pain, vomiting and diarrhea    COMPARISON:  None   TECHNIQUE:  CT examination of the abdomen and pelvis was performed without intravenous contrast   Axial, sagittal, and coronal 2D reformatted images were created from the source data and submitted for interpretation  Radiation dose length product (DLP) for this visit:  288 5 mGy-cm   This examination, like all CT scans performed in the [facility], was performed utilizing techniques to minimize radiation dose exposure, including the use of iterative reconstruction and automated exposure control  Enteric contrast was not administered  FINDINGS: ABDOMEN LOWER CHEST:  Patchy opacities in the left lower lobe and tree-in-bud opacities in the dependent right lower lobe likely due to an infectious or plantar process  Lajean Cassette LIVER/BILIARY TREE:  Unremarkable  GALLBLADDER:  No calcified gallstones  No pericholecystic inflammatory change  SPLEEN:  Unremarkable  PANCREAS:  Atrophic ADRENAL GLANDS:  Unremarkable  KIDNEYS/URETERS:  Question 2 left renal masses, with a possible 2 8 x 1 6 cm upper pole mass (series 4 image 60) and a 2 1 x 1 8 cm midpole mass (series 4 image 54)  Bilateral renal atrophy  No hydronephrosis  STOMACH AND BOWEL:  Unremarkable  APPENDIX:  No findings to suggest appendicitis  ABDOMINOPELVIC CAVITY:  No ascites or free intraperitoneal air  No lymphadenopathy  VESSELS:  Atherosclerotic changes are present  No evidence of aneurysm  PELVIS REPRODUCTIVE ORGANS:  Unremarkable for patient's age  URINARY BLADDER:  Unremarkable  ABDOMINAL WALL/INGUINAL REGIONS:  Unremarkable  OSSEOUS STRUCTURES:  No acute fracture or destructive osseous lesion  Impression: 1  No acute inflammatory process in the abdomen or pelvis  2   Patchy opacities in the left lower lobe and tree-in-bud opacities in the dependent right lower lobe likely due to an infectious or plantar process  3   Possible left renal masses measuring up to 2 8 cm  Recommend follow-up outpatient nonemergent renal ultrasound for further evaluation  Workstation performed: [id number]     Procedure: Xr Chest Portable    Result Date: 2/17/2019  Narrative: CHEST INDICATION:   resp distress   COMPARISON:  Chest radiograph 2/16/2019 EXAM PERFORMED/VIEWS:  XR CHEST PORTABLE FINDINGS: Mild cardiomegaly is unchanged  Pulmonary vascularity is within normal limits  The lungs are clear  Basilar opacity seen on abdominal CT 2/16/2019 are not visible on frontal plain radiographs  No pneumothorax or pleural effusion  Osseous structures appear within normal limits for patient age  Impression: No acute cardiopulmonary disease  Mild cardiomegaly is unchanged  Workstation performed: [id number]     Procedure: Xr Chest 2 Views    Result Date: 2/17/2019  Narrative: CHEST INDICATION:   weakness  COMPARISON:  None EXAM PERFORMED/VIEWS:  XR CHEST PA & LATERAL FINDINGS: Cardiomediastinal silhouette appears unremarkable  The lungs are clear  No pneumothorax or pleural effusion  Osseous structures appear within normal limits for patient age  Impression: No acute cardiopulmonary disease  Workstation performed: [id number]     Procedure: Us Kidney And Bladder    Result Date: 2/19/2019  Narrative: RENAL ULTRASOUND INDICATION:   RENAL FAILURE, CHRONIC renal failure  COMPARISON: None TECHNIQUE:   Ultrasound of the retroperitoneum was performed with a curvilinear transducer utilizing volumetric sweeps and still imaging techniques  FINDINGS: KIDNEYS: Symmetric and normal size  Right kidney:  10 x 3 7 cm  Left kidney:  10 4 x 4 6 cm  Right kidney The renal parenchyma is diffusely echogenic consistent with medical renal disease  Moderate diffuse cortical thinning  No suspicious masses detected  Few cortical simple cyst   The largest measures 1 4 x 0 7 x 1 1 cm  No hydronephrosis  No shadowing calculi  No perinephric fluid collections  Left kidney The renal parenchyma is diffusely echogenic consistent with medical renal disease  Moderate diffuse cortical thinning  No suspicious masses detected  Few simple cysts  The largest measures 3 1 x 2 1 x 2 3 cm  No hydronephrosis  No shadowing calculi  No perinephric fluid collections  URETERS: Nonvisualized   BLADDER: A hernández catheter is in place, decompressing the bladder  Hernández was clamped at time of imaging  Suboptimal distention of the bladder limits evaluation  No gross focal thickening or mass lesion  Impression: Chronic medical renal disease  No hydronephrosis  Bilateral renal simple cysts  The largest on the right measures 1 4 cm and on the left 3 1 cm  Suboptimal distention of the bladder limits evaluation  No gross focal thickening or mass lesion   Workstation performed: [id number]       Current Facility-Administered Medications   Medication Dose Route Frequency    acetaminophen (TYLENOL) tablet 650 mg  650 mg Oral Q6H PRN    aspirin (ECOTRIN LOW STRENGTH) EC tablet 81 mg  81 mg Oral Daily    cefepime (MAXIPIME) IVPB (premix) 1,000 mg  1,000 mg Intravenous Q24H    ferrous sulfate tablet 325 mg  325 mg Oral BID With Meals    ipratropium-albuterol (DUO-NEB) 0 5-2 5 mg/3 mL inhalation solution 3 mL  3 mL Nebulization Q6H While awake    LORazepam (ATIVAN) 2 mg/mL injection 0 5 mg  0 5 mg Intravenous Once    LORazepam (ATIVAN) 2 mg/mL injection 0 5 mg  0 5 mg Intravenous TID PRN    metoclopramide (REGLAN) injection 10 mg  10 mg Intravenous Q6H PRN    metoprolol tartrate (LOPRESSOR) tablet 25 mg  25 mg Oral Q12H ELIESER    NIFEdipine (PROCARDIA) capsule 20 mg  20 mg Oral Daily    ondansetron (ZOFRAN) injection 4 mg  4 mg Intravenous Q6H PRN    sodium bicarbonate 75 mEq in sodium chloride 0 45 % 1,000 mL infusion  75 mL/hr Intravenous Continuous    vancomycin (VANCOCIN) 750 mg in sodium chloride 0 9 % 250 mL IV piggyback  12 5 mg/kg Intravenous Q24H     Medications Discontinued During This Encounter   Medication Reason    cefepime (MAXIPIME) IVPB (premix) 2,000 mg     cefepime (MAXIPIME) IVPB (premix) 2,000 mg     cefepime (MAXIPIME) IVPB (premix) 1,000 mg     pantoprazole (PROTONIX) EC tablet 40 mg     NISOLDIPINE PO     albuterol inhalation solution 2 5 mg     NIFEdipine (PROCARDIA) capsule 20 mg     lactated ringers bolus 1,000 mL     lactated ringers bolus 1,000 mL     atorvastatin (LIPITOR) tablet 20 mg     cefepime (MAXIPIME) IVPB (premix) 1,000 mg     vancomycin (VANCOCIN) 1,250 mg in sodium chloride 0 9 % 250 mL IVPB     levalbuterol (XOPENEX) inhalation solution 1 25 mg     sodium chloride 0 9 % inhalation solution 3 mL     oseltamivir (TAMIFLU) capsule 30 mg     vancomycin (VANCOCIN) oral solution 125 mg     metroNIDAZOLE (FLAGYL) IVPB (premix) 500 mg     sodium chloride 0 9 % infusion     ipratropium-albuterol (DUO-NEB) 0 5-2 5 mg/3 mL inhalation solution 3 mL        Thierry St, DO

## 2019-02-20 NOTE — CONSULTS
Vancomycin Assessment    Nigel Hatchet is a 78 y o  female who is currently receiving vancomycin 750 mg IV q24 for Pneumonia, other sepsis   Relevant clinical data and objective history reviewed:  Creatinine   Date Value Ref Range Status   02/19/2019 1 66 (H) 0 60 - 1 20 mg/dL Final     Comment:     Standardized to IDMS reference method   02/18/2019 1 94 (H) 0 60 - 1 20 mg/dL Final     Comment:     Standardized to IDMS reference method   02/17/2019 2 36 (H) 0 60 - 1 20 mg/dL Final     Comment:     Standardized to IDMS reference method     /79 (BP Location: Right arm)   Pulse (!) 27   Temp 97 6 °F (36 4 °C) (Temporal)   Resp 18   Ht 5' 8" (1 727 m)   Wt 57 6 kg (126 lb 14 4 oz)   SpO2 93%   BMI 19 30 kg/m²   I/O last 3 completed shifts: In: 1442 5 [P O :240; I V :1102 5; IV Piggyback:100]  Out: 1550 [Urine:1550]  Lab Results   Component Value Date/Time    BUN 21 02/19/2019 05:28 AM    WBC 11 00 (H) 02/19/2019 05:28 AM    HGB 9 2 (L) 02/19/2019 05:28 AM    HCT 28 4 (L) 02/19/2019 05:28 AM    MCV 92 02/19/2019 05:28 AM     02/19/2019 05:28 AM     Temp Readings from Last 3 Encounters:   02/19/19 97 6 °F (36 4 °C) (Temporal)     Vancomycin Days of Therapy: 4    Assessment/Plan  The patient is currently on vancomycin utilizing scheduled dosing  Baseline risks associated with therapy include: pre-existing renal impairment, advanced age and dehydration  The patient is receiving 750 mg IV q24 with the most recent vancomycin level being at steady-state and therapeutic based on a goal of 15-20 (appropriate for most indications) ; therefore, is clinically appropriate and dose will be continued   Pharmacy will continue to follow closely for s/sx of nephrotoxicity, infusion reactions and appropriateness of therapy  BMP and CBC will be ordered per protocol  Plan for trough as patient approaches steady state, prior to the 2nd  dose at approximately 1700 on 2/21/19   Pharmacy will continue to follow the patient?s culture results and clinical progress daily      Bud Castellon, Pharmacist

## 2019-02-21 VITALS
WEIGHT: 126.9 LBS | BODY MASS INDEX: 19.23 KG/M2 | TEMPERATURE: 100 F | HEIGHT: 68 IN | OXYGEN SATURATION: 92 % | DIASTOLIC BLOOD PRESSURE: 70 MMHG | HEART RATE: 62 BPM | SYSTOLIC BLOOD PRESSURE: 151 MMHG | RESPIRATION RATE: 16 BRPM

## 2019-02-21 LAB
ANION GAP SERPL CALCULATED.3IONS-SCNC: 7 MMOL/L (ref 4–13)
BACTERIA BLD CULT: NORMAL
BACTERIA BLD CULT: NORMAL
BUN SERPL-MCNC: 12 MG/DL (ref 7–25)
CALCIUM SERPL-MCNC: 7.4 MG/DL (ref 8.6–10.5)
CHLORIDE SERPL-SCNC: 107 MMOL/L (ref 98–107)
CO2 SERPL-SCNC: 24 MMOL/L (ref 21–31)
CREAT SERPL-MCNC: 1.4 MG/DL (ref 0.6–1.2)
GFR SERPL CREATININE-BSD FRML MDRD: 35 ML/MIN/1.73SQ M
GLUCOSE SERPL-MCNC: 97 MG/DL (ref 65–99)
INR PPP: 2.28 (ref 0.9–1.5)
POTASSIUM SERPL-SCNC: 2.8 MMOL/L (ref 3.5–5.5)
PROTHROMBIN TIME: 26.7 SECONDS (ref 10.2–13)
SODIUM SERPL-SCNC: 138 MMOL/L (ref 134–143)

## 2019-02-21 PROCEDURE — 99239 HOSP IP/OBS DSCHRG MGMT >30: CPT | Performed by: INTERNAL MEDICINE

## 2019-02-21 PROCEDURE — 85610 PROTHROMBIN TIME: CPT | Performed by: INTERNAL MEDICINE

## 2019-02-21 PROCEDURE — 80048 BASIC METABOLIC PNL TOTAL CA: CPT | Performed by: INTERNAL MEDICINE

## 2019-02-21 PROCEDURE — 94664 DEMO&/EVAL PT USE INHALER: CPT

## 2019-02-21 PROCEDURE — 94760 N-INVAS EAR/PLS OXIMETRY 1: CPT

## 2019-02-21 RX ORDER — CEFDINIR 300 MG/1
300 CAPSULE ORAL EVERY 24 HOURS
Qty: 3 CAPSULE | Refills: 0 | Status: SHIPPED | OUTPATIENT
Start: 2019-02-22 | End: 2019-02-22

## 2019-02-21 RX ORDER — POTASSIUM CHLORIDE 20 MEQ/1
20 TABLET, EXTENDED RELEASE ORAL DAILY
Qty: 10 TABLET | Refills: 0 | Status: SHIPPED | OUTPATIENT
Start: 2019-02-21 | End: 2019-02-22

## 2019-02-21 RX ORDER — GUAIFENESIN 100 MG/5ML
200 SOLUTION ORAL EVERY 4 HOURS PRN
Status: DISCONTINUED | OUTPATIENT
Start: 2019-02-21 | End: 2019-02-21 | Stop reason: HOSPADM

## 2019-02-21 RX ORDER — POTASSIUM CHLORIDE 20 MEQ/1
20 TABLET, EXTENDED RELEASE ORAL
Status: DISCONTINUED | OUTPATIENT
Start: 2019-02-21 | End: 2019-02-21 | Stop reason: HOSPADM

## 2019-02-21 RX ADMIN — METOPROLOL TARTRATE 25 MG: 25 TABLET, FILM COATED ORAL at 08:09

## 2019-02-21 RX ADMIN — ASPIRIN 81 MG: 81 TABLET, COATED ORAL at 08:09

## 2019-02-21 RX ADMIN — LORAZEPAM 0.5 MG: 2 INJECTION INTRAMUSCULAR; INTRAVENOUS at 08:12

## 2019-02-21 RX ADMIN — CEFEPIME HYDROCHLORIDE 1000 MG: 1 INJECTION, SOLUTION INTRAVENOUS at 00:28

## 2019-02-21 RX ADMIN — POTASSIUM CHLORIDE 20 MEQ: 1500 TABLET, EXTENDED RELEASE ORAL at 12:32

## 2019-02-21 RX ADMIN — FERROUS SULFATE TAB 325 MG (65 MG ELEMENTAL FE) 325 MG: 325 (65 FE) TAB at 08:09

## 2019-02-21 RX ADMIN — POTASSIUM CHLORIDE 20 MEQ: 1500 TABLET, EXTENDED RELEASE ORAL at 09:48

## 2019-02-21 RX ADMIN — GUAIFENESIN 200 MG: 200 SOLUTION ORAL at 05:39

## 2019-02-21 RX ADMIN — NIFEDIPINE 20 MG: 10 CAPSULE ORAL at 08:09

## 2019-02-21 RX ADMIN — ACETAMINOPHEN 650 MG: 325 TABLET ORAL at 05:39

## 2019-02-21 NOTE — NURSING NOTE
Patient discharged to home with [facility] health  IV removed with catheter tip intact, DSD and pressure applied  All belongings returned to patient upon discharge  Patient and daughter verbalized understanding of discharge instructions

## 2019-02-21 NOTE — PLAN OF CARE
Problem: Potential for Falls  Goal: Patient will remain free of falls  Description  INTERVENTIONS:  - Assess patient frequently for physical needs  -  Identify cognitive and physical deficits and behaviors that affect risk of falls  -  [place] fall precautions as indicated by assessment   - Educate patient/family on patient safety including physical limitations  - Instruct patient to call for assistance with activity based on assessment  - Modify environment to reduce risk of injury  - Consider OT/PT consult to assist with strengthening/mobility  Outcome: Progressing     Problem: Prexisting or High Potential for Compromised Skin Integrity  Goal: Skin integrity is maintained or improved  Description  INTERVENTIONS:  - Identify patients at risk for skin breakdown  - Assess and monitor skin integrity  - Assess and monitor nutrition and hydration status  - Monitor labs (i e  albumin)  - Assess for incontinence   - Turn and reposition patient  - Assist with mobility/ambulation  - Relieve pressure over bony prominences  - Avoid friction and shearing  - Provide appropriate hygiene as needed including keeping skin clean and dry  - Evaluate need for skin moisturizer/barrier cream  - Collaborate with interdisciplinary team (i e  Nutrition, Rehabilitation, etc )   - Patient/family teaching  Outcome: Progressing     Problem: CARDIOVASCULAR - ADULT  Goal: Maintains optimal cardiac output and hemodynamic stability  Description  INTERVENTIONS:  - Monitor I/O, vital signs and rhythm  - Monitor for S/S and trends of decreased cardiac output i e  bleeding, hypotension  - Administer and titrate ordered vasoactive medications to optimize hemodynamic stability  - Assess quality of pulses, skin color and temperature  - Assess for signs of decreased coronary artery perfusion - ex   Angina  - Instruct patient to report change in severity of symptoms  Outcome: Progressing  Goal: Absence of cardiac dysrhythmias or at baseline rhythm  Description  INTERVENTIONS:  - Continuous cardiac monitoring, monitor vital signs, obtain 12 lead EKG if indicated  - Administer antiarrhythmic and heart rate control medications as ordered  - Monitor electrolytes and administer replacement therapy as ordered  Outcome: Progressing     Problem: RESPIRATORY - ADULT  Goal: Achieves optimal ventilation and oxygenation  Description  INTERVENTIONS:  - Assess for changes in respiratory status  - Assess for changes in mentation and behavior  - Position to facilitate oxygenation and minimize respiratory effort  - Oxygen administration by appropriate delivery method based on oxygen saturation (per order) or ABGs  - Initiate smoking cessation education as indicated  - Encourage broncho-pulmonary hygiene including cough, deep breathe, Incentive Spirometry  - Assess the need for suctioning and aspirate as needed  - Assess and instruct to report SOB or any respiratory difficulty  - Respiratory Therapy support as indicated  Outcome: Progressing     Problem: MUSCULOSKELETAL - ADULT  Goal: Maintain or return mobility to safest level of function  Description  INTERVENTIONS:  - Assess patient's ability to carry out ADLs; assess patient's baseline for ADL function and identify physical deficits which impact ability to perform ADLs (bathing, care of mouth/teeth, toileting, grooming, dressing, etc )  - Assess/evaluate cause of self-care deficits   - Assess range of motion  - Assess patient's mobility; develop plan if impaired  - Assess patient's need for assistive devices and provide as appropriate  - Encourage maximum independence but intervene and supervise when necessary  - Involve family in performance of ADLs  - Assess for home care needs following discharge   - Request OT consult to assist with ADL evaluation and planning for discharge  - Provide patient education as appropriate  Outcome: Progressing  Goal: Maintain proper alignment of affected body part  Description  INTERVENTIONS:  - Support, maintain and protect limb and body alignment  - Provide pt/fam with appropriate education  Outcome: Progressing     Problem: DISCHARGE PLANNING - CARE MANAGEMENT  Goal: Discharge to post-acute care or home with appropriate resources  Description  INTERVENTIONS:  - Conduct assessment to determine patient/family and health care team treatment goals, and need for post-acute services based on payer coverage, community resources, and patient preferences, and barriers to discharge  - Address psychosocial, clinical, and financial barriers to discharge as identified in assessment in conjunction with the patient/family and health care team  - Arrange appropriate level of post-acute services according to patient?s   needs and preference and payer coverage in collaboration with the physician and health care team  - Communicate with and update the patient/family, physician, and health care team regarding progress on the discharge plan  - Arrange appropriate transportation to post-acute venues  Outcome: Progressing     Problem: Nutrition/Hydration-ADULT  Goal: Nutrient/Hydration intake appropriate for improving, restoring or maintaining nutritional needs  Description  Monitor and assess patient's nutrition/hydration status for malnutrition (ex- brittle hair, bruises, dry skin, pale skin and conjunctiva, muscle wasting, smooth red tongue, and disorientation)  Collaborate with interdisciplinary team and initiate plan and interventions as ordered  Monitor patient's weight and dietary intake as ordered or per policy  Utilize nutrition screening tool and intervene per policy  Determine patient's food preferences and provide high-protein, high-caloric foods as appropriate       INTERVENTIONS:  - Monitor oral intake, urinary output, labs, and treatment plans  - Assess nutrition and hydration status and recommend course of action  - Evaluate amount of meals eaten  - Assist patient with eating if necessary   - Allow adequate time for meals  - Recommend/ encourage appropriate diets, oral nutritional supplements, and vitamin/mineral supplements  - Order, calculate, and assess calorie counts as needed  - Recommend, monitor, and adjust tube feedings and TPN/PPN based on assessed needs  - Assess need for intravenous fluids  - Provide specific nutrition/hydration education as appropriate  - Include patient/family/caregiver in decisions related to nutrition  Outcome: Progressing

## 2019-02-21 NOTE — DISCHARGE SUMMARY
Discharge- Luis Dorado 1940, 78 y o  female MRN: [id number]    Unit/Bed#: -01 Encounter: [id number]    Primary Care Provider: Jani Isbell MD   Date and time admitted to hospital: 2/16/2019  1:51 PM        * Severe sepsis [facility])  Assessment & Plan  · Improving  · Lactic acidosis resolved, patient currently afebrile, and leukocytosis improved  · Procalcitonin level improving  · Will discharge on cefdinir to complete 3 more days  · Cultures have been negative to date  · Secondary to bilateral pneumonia (healthcare associated) of suspected gram-negative      Pneumonia due to infectious organism  Assessment & Plan  · Cultures negative to date  · Continue cefdinir as outpatient for 3 more days  · Follow up with PCP    Acute respiratory failure with hypoxia ([facility] )  Assessment & Plan  · Acute respiratory failure was likely secondary to pneumonia  · The patient presented with pulse ox of 85% on room air  · Improved    Anemia  Assessment & Plan  · Likely multifactorial  · Hemoglobin appears to be stable  · No signs of acute bleeding at this time  · Will continue to monitor  · Patient noted to have low iron level, will start supplements  · Transfuse as needed    Hypokalemia  Assessment & Plan  · Will discharge on potassium supplementation  · Follow up potassium level with PCP within the next few days    Metabolic acidosis  Assessment & Plan  · Likely due to diarrhea  · Bicarb improved  · Nephrology input appreciated  · Follow-up BMP with PCP within the next few days    JATINDER (acute kidney injury) ([facility] )  Assessment & Plan  · Suspected to be secondary to volume depletion and acute infection  · Patient has a history of chronic kidney disease-unclear what her baseline creatinine is  · Creatinine improving  · Renal ultrasound appreciated    Chronic atrial fibrillation ([facility] )  Assessment & Plan  · Rate controlled with metoprolol  · Continue anticoagulation with Coumadin  · INR therapeutic will continue to monitor  · Adjust Coumadin dosing accordingly      Discharging Physician / Practitioner: Marine Smith MD  PCP: Tahira Smith MD  Admission Date:   Admission Orders (From admission, onward)    Ordered        02/16/19 1618  Inpatient Admission (expected length of stay for this patient Order details is greater than two midnights)  Once     Order ID Start Status   [id number] 02/16/19 1618 Completed              Discharge Date: 02/21/19    Resolved Problems  Date Reviewed: 2/16/2019    None          Consultations During Hospital Stay:  · Nephrology, Gastroenterology    Procedures Performed:   · None    Significant Findings / Test Results:   · Pneumonia    Incidental Findings:   · None     Test Results Pending at Discharge (will require follow up): · None     Outpatient Tests Requested:  · BMP to evaluate potassium level within the next few days    Complications:  None    Reason for Admission:  Pneumonia    Hospital Course:     Boubacar Peter is a 78 y o  female patient who originally presented to the hospital on 2/16/2019 due to GI symptoms  Stool studies were sent and negative  Patient was found to have suspected pneumonia on x-ray  Patient was started on IV antibiotics  Cultures remained negative  Procalcitonin was elevated however did trend down  Patient was also noted to have acute kidney injury with metabolic acidosis  Patient's kidney function did improve with fluids  Patient did require bicarb drip and metabolic acidosis did resolve  Acidosis likely related to patient's diarrhea  Patient was also noted to be hypokalemic and potassium was replaced  INR level initially elevated and did return to therapeutic range  Coumadin was continued  Family refused rehab  Case management arranged for home physical therapy and visiting nurse  Patient was discharged on cefdinir to complete 3 more days  Leukocytosis did improve and patient was afebrile at time of discharge  Cultures remained negative  Patient was also discharged on potassium and advised to follow up with PCP for repeat BMP within the next couple of days  Family and patient were both in agreement with discharge planning  Patient was stable for discharge home  Please see above list of diagnoses and related plan for additional information  Condition at Discharge: stable     Discharge Day Visit / Exam:     Subjective:  No complaints today    Vitals: Blood Pressure: 151/70 (02/21/19 0735)  Pulse: 62 (02/21/19 0809)  Temperature: 100 °F (37 8 °C) (02/21/19 0735)  Temp Source: Tympanic (02/21/19 0735)  Respirations: 16 (02/21/19 0735)  Height: 5' 8" (172 7 cm) (02/16/19 1715)  Weight - Scale: 57 6 kg (126 lb 14 4 oz) (02/16/19 1715)  SpO2: 92 % (02/21/19 0735)     Exam:   Physical Exam   Constitutional: No distress  Frail elderly female   HENT:   Head: Normocephalic and atraumatic  Eyes: Conjunctivae and EOM are normal    Neck: Normal range of motion  Neck supple  Cardiovascular: Normal rate and regular rhythm  Pulmonary/Chest: Effort normal  No respiratory distress  Abdominal: Soft  She exhibits no distension  There is no tenderness  Musculoskeletal:   Generalized weakness noted  Trace edema  Skin: Skin is warm and dry  Multiple ecchymosis noted on bilateral upper extremities   Psychiatric: She has a normal mood and affect  Discussion with Family:  Spoke to family at the bedside  Discharge planning was explained  Daughter did have questions about home health which as per case management was resumed  Discharge instructions/Information to patient and family:   See after visit summary for information provided to patient and family  Provisions for Follow-Up Care:  See after visit summary for information related to follow-up care and any pertinent home health orders        Disposition:     Home with [facility]NA Services (Reminder: Complete face to face encounter)    For Discharges to [address] [facility]:   · Not Applicable to this Patient - Not Applicable to this Patient    Planned Readmission: no     Discharge Statement:  I spent 35 minutes discharging the patient  This time was spent on the day of discharge  I had direct contact with the patient on the day of discharge  Greater than 50% of the total time was spent examining patient, answering all patient questions, arranging and discussing plan of care with patient as well as directly providing post-discharge instructions  Additional time then spent on discharge activities  Discharge Medications:  See after visit summary for reconciled discharge medications provided to patient and family        ** Please Note: This note has been constructed using a voice recognition system **

## 2019-02-21 NOTE — PROGRESS NOTES
Progress Note - Nephrology   Nigel Hatchet 78 y o  female MRN: [id number]  Unit/Bed#: -01 Encounter: [id number]    A/P:  1  A/P:  1  Acute kidney injury atop chronic kidney disease               renal function continues to improve slowly  Continue to encourage p o  Intake  2  Chronic kidney disease stage IIIA with baseline creatinine about 1 mg/dL  3  Chronic tubulointerstitial disease likely  4  Acidosis   Bicarbonate level appropriate status post bicarb drip with 150 mEq per L of sodium bicarbonate  At the same time the patient's diarrhea also has significantly decreased  5  Hypokalemia   Potassium Level significantly reduced this morning, will give the patient 100 mEq of potassium chloride, she is to be discharged later today and I discussed the importance of having this potassium along with her kidney function checked at some point in the very near future  The family noted that she will be seeing her primary care provider on Monday every 25th, however, is far too long and they are agreeable in having labs checked either tomorrow or Saturday morning  Patient will be discharged on 20 mEq of oral potassium chloride once daily for now  Further adjustments supplementation as indicated by primary care provider  6  Lactic acidosis              resolved  6  Severe sepsis due to healthcare associated pneumonia with possible gastroenteritis               resolved at this time  7   Possible dysphagia         Follow up reason for today's visit:  Acute renal failure/chronic kidney disease/acid-base disorder    Severe sepsis [facility])    Patient Active Problem List   Diagnosis    Pneumonia due to infectious organism    Severe sepsis ([address] )    Nausea vomiting and diarrhea    Chronic atrial fibrillation (HCC)    Hyperlipidemia    Acute [facility] failure with [facility] )    JATINDER (acute kidney injury) ([facility] )    Anemia    Metabolic acidosis    Hypokalemia         Subjective:   Patient continues to have copious amounts of diarrhea, she cannot recall how many times she went to the toilet for separate bowel movements  Objective:     Vitals: Blood pressure 151/70, pulse 62, temperature 100 °F (37 8 °C), temperature source Tympanic, resp  rate 16, height 5' 8" (1 727 m), weight 57 6 kg (126 lb 14 4 oz), SpO2 92 %  ,Body mass index is 19 3 kg/m²  Weight (last 2 days) before discharge     None            Intake/Output Summary (Last 24 hours) at 2/21/2019 1335  Last data filed at 2/20/2019 2218  Gross per 24 hour   Intake 120 ml   Output    Net 120 ml     I/O last 3 completed shifts: In: 120 [P O :120]  Out: -     Urethral Catheter Non-latex 16 Fr   (Active)   Amt returned on insertion(mL) 100 mL 2/16/2019 11:01 PM   Collection Container Standard drainage bag 2/17/2019  9:00 PM   Securement Method Securing device (Describe) 2/17/2019  9:00 PM   Output (mL) 350 mL 2/19/2019  5:21 AM       Physical Exam: /70 (BP Location: Right arm)   Pulse 62   Temp 100 °F (37 8 °C) (Tympanic)   Resp 16   Ht 5' 8" (1 727 m)   Wt 57 6 kg (126 lb 14 4 oz)   SpO2 92%   BMI 19 30 kg/m²     General Appearance:    Alert, cooperative, no distress, appears stated age   Head:    Normocephalic, without obvious abnormality, atraumatic   Eyes:    Conjunctiva/corneas clear   Ears:    Normal external ears   Nose:   Nares normal, septum midline, mucosa normal, no drainage    or sinus tenderness   Throat:   Lips, mucosa, and tongue normal; teeth and gums normal   Neck:   Supple   Back:     Symmetric, no curvature, ROM normal, no CVA tenderness   Lungs:     Reduced, some alveolar crackles appreciated with upper airway sounds   Chest wall:    No tenderness or deformity   Heart:    Regular rate and rhythm, S1 and S2 normal, no murmur, rub   or gallop   Abdomen:     Soft, non-tender, bowel sounds active   Extremities:   Extremities normal, atraumatic, no cyanosis or edema   Skin:   Skin color, texture, turgor normal, no rashes or lesions Lymph nodes:   Cervical normal   Neurologic:   CNII-XII intact            Lab, Imaging and other studies: I have personally reviewed pertinent labs  CBC:   No results found for: WBC, HGB, HCT, MCV, PLT, ADJUSTEDWBC, MCH, MCHC, RDW, MPV, NRBC  CMP:   Lab Results   Component Value Date    K 2 8 (L) 02/21/2019     02/21/2019    CO2 24 02/21/2019    BUN 12 02/21/2019    CREATININE 1 40 (H) 02/21/2019    CALCIUM 7 4 (L) 02/21/2019    EGFR 35 02/21/2019         Results from last 7 days   Lab Units 02/21/19  0504 02/20/19  0518 02/19/19  0528  02/17/19  0558 02/16/19  1433   POTASSIUM mmol/L 2 8* 3 6 3 3*   < > 4 2 4 2   CHLORIDE mmol/L 107 116* 117*   < > 111* 109*   CO2 mmol/L 24 13* 14*   < > 17* 18*   BUN mg/dL 12 17 21   < > 34* 39*   CREATININE mg/dL 1 40* 1 51* 1 66*   < > 2 36* 2 77*   CALCIUM mg/dL 7 4* 7 7* 8 0*   < > 8 5* 9 6   ALK PHOS U/L  --   --   --   --  28* 38*   ALT U/L  --   --   --   --  5* 4*   AST U/L  --   --   --   --  13 12*    < > = values in this interval not displayed  Phosphorus: No results found for: PHOS  Magnesium: No results found for: MG  Urinalysis: No results found for: Jarod Levans, SPECGRAV, PHUR, LEUKOCYTESUR, NITRITE, PROTEINUA, GLUCOSEU, KETONESU, BILIRUBINUR, BLOODU  Ionized Calcium: No results found for: CAION  Coagulation:   Lab Results   Component Value Date    INR 2 28 (H) 02/21/2019     Troponin: No results found for: TROPONINI  ABG: No results found for: PHART, CBL0NFK, PO2ART, HCA3BGC, A7QTXYFN, BEART, SOURCE  Radiology review:     IMAGING  Procedure: Ct Abdomen Pelvis Wo Contrast    Result Date: 2/16/2019  Narrative: CT ABDOMEN AND PELVIS WITHOUT IV CONTRAST INDICATION:   78-year-old woman presenting with abdominal pain, vomiting and diarrhea    COMPARISON:  None   TECHNIQUE:  CT examination of the abdomen and pelvis was performed without intravenous contrast   Axial, sagittal, and coronal 2D reformatted images were created from the source data and submitted for interpretation  Radiation dose length product (DLP) for this visit:  288 5 mGy-cm   This examination, like all CT scans performed in the [facility], was performed utilizing techniques to minimize radiation dose exposure, including the use of iterative reconstruction and automated exposure control  Enteric contrast was not administered  FINDINGS: ABDOMEN LOWER CHEST:  Patchy opacities in the left lower lobe and tree-in-bud opacities in the dependent right lower lobe likely due to an infectious or plantar process  Hayden De La Cruz LIVER/BILIARY TREE:  Unremarkable  GALLBLADDER:  No calcified gallstones  No pericholecystic inflammatory change  SPLEEN:  Unremarkable  PANCREAS:  Atrophic ADRENAL GLANDS:  Unremarkable  KIDNEYS/URETERS:  Question 2 left renal masses, with a possible 2 8 x 1 6 cm upper pole mass (series 4 image 60) and a 2 1 x 1 8 cm midpole mass (series 4 image 54)  Bilateral renal atrophy  No hydronephrosis  STOMACH AND BOWEL:  Unremarkable  APPENDIX:  No findings to suggest appendicitis  ABDOMINOPELVIC CAVITY:  No ascites or free intraperitoneal air  No lymphadenopathy  VESSELS:  Atherosclerotic changes are present  No evidence of aneurysm  PELVIS REPRODUCTIVE ORGANS:  Unremarkable for patient's age  URINARY BLADDER:  Unremarkable  ABDOMINAL WALL/INGUINAL REGIONS:  Unremarkable  OSSEOUS STRUCTURES:  No acute fracture or destructive osseous lesion  Impression: 1  No acute inflammatory process in the abdomen or pelvis  2   Patchy opacities in the left lower lobe and tree-in-bud opacities in the dependent right lower lobe likely due to an infectious or plantar process  3   Possible left renal masses measuring up to 2 8 cm  Recommend follow-up outpatient nonemergent renal ultrasound for further evaluation  Workstation performed: [id number]     Procedure: Xr Chest Portable    Result Date: 2/17/2019  Narrative: CHEST INDICATION:   resp distress   COMPARISON:  Chest radiograph 2/16/2019 EXAM PERFORMED/VIEWS:  XR CHEST PORTABLE FINDINGS: Mild cardiomegaly is unchanged  Pulmonary vascularity is within normal limits  The lungs are clear  Basilar opacity seen on abdominal CT 2/16/2019 are not visible on frontal plain radiographs  No pneumothorax or pleural effusion  Osseous structures appear within normal limits for patient age  Impression: No acute cardiopulmonary disease  Mild cardiomegaly is unchanged  Workstation performed: [id number]     Procedure: Xr Chest 2 Views    Result Date: 2/17/2019  Narrative: CHEST INDICATION:   weakness  COMPARISON:  None EXAM PERFORMED/VIEWS:  XR CHEST PA & LATERAL FINDINGS: Cardiomediastinal silhouette appears unremarkable  The lungs are clear  No pneumothorax or pleural effusion  Osseous structures appear within normal limits for patient age  Impression: No acute cardiopulmonary disease  Workstation performed: [id number]     Procedure: Us Kidney And Bladder    Result Date: 2/19/2019  Narrative: RENAL ULTRASOUND INDICATION:   RENAL FAILURE, CHRONIC renal failure  COMPARISON: None TECHNIQUE:   Ultrasound of the retroperitoneum was performed with a curvilinear transducer utilizing volumetric sweeps and still imaging techniques  FINDINGS: KIDNEYS: Symmetric and normal size  Right kidney:  10 x 3 7 cm  Left kidney:  10 4 x 4 6 cm  Right kidney The renal parenchyma is diffusely echogenic consistent with medical renal disease  Moderate diffuse cortical thinning  No suspicious masses detected  Few cortical simple cyst   The largest measures 1 4 x 0 7 x 1 1 cm  No hydronephrosis  No shadowing calculi  No perinephric fluid collections  Left kidney The renal parenchyma is diffusely echogenic consistent with medical renal disease  Moderate diffuse cortical thinning  No suspicious masses detected  Few simple cysts  The largest measures 3 1 x 2 1 x 2 3 cm  No hydronephrosis  No shadowing calculi  No perinephric fluid collections  URETERS: Nonvisualized   BLADDER: A hernández catheter is in place, decompressing the bladder  Hernández was clamped at time of imaging  Suboptimal distention of the bladder limits evaluation  No gross focal thickening or mass lesion  Impression: Chronic medical renal disease  No hydronephrosis  Bilateral renal simple cysts  The largest on the right measures 1 4 cm and on the left 3 1 cm  Suboptimal distention of the bladder limits evaluation  No gross focal thickening or mass lesion   Workstation performed: [id number]       Current Facility-Administered Medications   Medication Dose Route Frequency    acetaminophen (TYLENOL) tablet 650 mg  650 mg Oral Q6H PRN    aspirin (ECOTRIN LOW STRENGTH) EC tablet 81 mg  81 mg Oral Daily    cefepime (MAXIPIME) IVPB (premix) 1,000 mg  1,000 mg Intravenous Q24H    ferrous sulfate tablet 325 mg  325 mg Oral BID With Meals    guaiFENesin (ROBITUSSIN) oral solution 200 mg  200 mg Oral Q4H PRN    ipratropium-albuterol (DUO-NEB) 0 5-2 5 mg/3 mL inhalation solution 3 mL  3 mL Nebulization Q6H While awake    LORazepam (ATIVAN) 2 mg/mL injection 0 5 mg  0 5 mg Intravenous Once    LORazepam (ATIVAN) 2 mg/mL injection 0 5 mg  0 5 mg Intravenous TID PRN    metoclopramide (REGLAN) injection 10 mg  10 mg Intravenous Q6H PRN    metoprolol tartrate (LOPRESSOR) tablet 25 mg  25 mg Oral Q12H ELIESER    NIFEdipine (PROCARDIA) capsule 20 mg  20 mg Oral Daily    ondansetron (ZOFRAN) injection 4 mg  4 mg Intravenous Q6H PRN    potassium chloride (K-DUR,KLOR-CON) CR tablet 20 mEq  20 mEq Oral Q3H    warfarin (COUMADIN) tablet 2 mg  2 mg Oral Daily (warfarin)     Medications Discontinued During This Encounter   Medication Reason    cefepime (MAXIPIME) IVPB (premix) 2,000 mg     cefepime (MAXIPIME) IVPB (premix) 2,000 mg     cefepime (MAXIPIME) IVPB (premix) 1,000 mg     pantoprazole (PROTONIX) EC tablet 40 mg     NISOLDIPINE PO     albuterol inhalation solution 2 5 mg     NIFEdipine (PROCARDIA) capsule 20 mg     lactated ringers bolus 1,000 mL     lactated ringers bolus 1,000 mL     atorvastatin (LIPITOR) tablet 20 mg     cefepime (MAXIPIME) IVPB (premix) 1,000 mg     vancomycin (VANCOCIN) 1,250 mg in sodium chloride 0 9 % 250 mL IVPB     levalbuterol (XOPENEX) inhalation solution 1 25 mg     sodium chloride 0 9 % inhalation solution 3 mL     oseltamivir (TAMIFLU) capsule 30 mg     vancomycin (VANCOCIN) oral solution 125 mg     metroNIDAZOLE (FLAGYL) IVPB (premix) 500 mg     sodium chloride 0 9 % infusion     ipratropium-albuterol (DUO-NEB) 0 5-2 5 mg/3 mL inhalation solution 3 mL     sodium bicarbonate 75 mEq in sodium chloride 0 45 % 1,000 mL infusion     vancomycin (VANCOCIN) 750 mg in sodium chloride 0 9 % 250 mL IV piggyback     sodium bicarbonate 150 mEq in dextrose 5 % 1,000 mL infusion     NIFEdipine (PROCARDIA) 20 MG capsule Stop Taking at Discharge     An additional 35 minutes spent in direct conversation with the patient's family along with the patient  I reviewed her case, discussed current issue with hypokalemia, and future plan of care  Goal is for the patient to not need to be readmitted to the hospital for aggressive potassium supplementation  Family understood the need for the patient to have monitoring of her potassium along with supplementation necessary to replete her stores  All questions were answered  Greater than 50% of this time was spent at the bedside    Coordination of care includes the a discussion of the patient with hospitalist     [facility]

## 2019-02-21 NOTE — SOCIAL WORK
Pt discussed during care coordination rounds  Pt for discharge home today  PT had recommended STR but daughter not in agreement with same  Per daughter pt will return to [place] where she resides with her  Pt is active with [facility]  CM previously made them aware pt was here and will fax AVS to ensure they will follow up with pt on discharge as requested by daughter  Family will transport   No further needs at this time

## 2019-02-22 NOTE — UTILIZATION REVIEW
Notification of Discharge  This is a Notification of Discharge from our facility [address]  Please be advised that this patient has been discharge from our facility  Below you will find the admission and discharge date and time including the patients disposition  PRESENTATION DATE: 2/16/2019  1:51 PM  IP ADMISSION DATE: 2/16/19 1618  DISCHARGE DATE: 2/21/2019 12:44 PM  DISPOSITION: Home with [address] Utilization Review Department  Phone: [phone]; Fax [phone]  [email or web address]  ATTENTION: Please call with any questions or concerns to [phone]  and carefully listen to the prompts so that you are directed to the right person  Send all requests for admission clinical reviews, approved or denied determinations and any other requests to fax [phone]   All voicemails are confidential